# Patient Record
Sex: FEMALE | Race: WHITE | URBAN - METROPOLITAN AREA
[De-identification: names, ages, dates, MRNs, and addresses within clinical notes are randomized per-mention and may not be internally consistent; named-entity substitution may affect disease eponyms.]

---

## 2023-07-13 ENCOUNTER — TELEPHONE (OUTPATIENT)
Dept: CARDIOLOGY CLINIC | Age: 48
End: 2023-07-13

## 2023-07-13 NOTE — TELEPHONE ENCOUNTER
Patient was referred by Self, 2nd opinion  to the [address] [place] [address]  location with Dr. Lulu Chino. Patient has been scheduled for 09/26 at 8:45am (am/pm). Patient verbalized understanding of appointment instructions. Call complete.

## 2023-08-25 LAB
AVERAGE GLUCOSE: 117
CHOLESTEROL, TOTAL: 179 MG/DL
CHOLESTEROL/HDL RATIO: NORMAL
HBA1C MFR BLD: 5.7 %
HDLC SERPL-MCNC: 41 MG/DL (ref 35–70)
LDL CHOLESTEROL CALCULATED: 124 MG/DL (ref 0–160)
NONHDLC SERPL-MCNC: 138 MG/DL
TRIGL SERPL-MCNC: 72 MG/DL
VLDLC SERPL CALC-MCNC: NORMAL MG/DL

## 2023-09-20 ENCOUNTER — TELEPHONE (OUTPATIENT)
Dept: CARDIOLOGY CLINIC | Age: 48
End: 2023-09-20

## 2023-09-20 NOTE — TELEPHONE ENCOUNTER
Scheduled as a new patient for [facility] 9/26.  Needs to be re-scheduled with Gen cards

## 2023-09-20 NOTE — TELEPHONE ENCOUNTER
Called and spoke to the pt, pt was RS with [facility] 9/22 at the [facility] office due to the pt needing to return to work on 10/3.     Call Complete

## 2023-09-29 ENCOUNTER — OFFICE VISIT (OUTPATIENT)
Dept: INTERNAL MEDICINE CLINIC | Age: 48
End: 2023-09-29
Payer: COMMERCIAL

## 2023-09-29 VITALS
SYSTOLIC BLOOD PRESSURE: 118 MMHG | WEIGHT: 274 LBS | DIASTOLIC BLOOD PRESSURE: 72 MMHG | BODY MASS INDEX: 43 KG/M2 | TEMPERATURE: 97.1 F | HEART RATE: 100 BPM | HEIGHT: 67 IN | OXYGEN SATURATION: 99 %

## 2023-09-29 DIAGNOSIS — R07.9 CHEST PAIN, UNSPECIFIED TYPE: ICD-10-CM

## 2023-09-29 DIAGNOSIS — E78.00 PURE HYPERCHOLESTEROLEMIA: ICD-10-CM

## 2023-09-29 DIAGNOSIS — F32.A ANXIETY AND DEPRESSION: ICD-10-CM

## 2023-09-29 DIAGNOSIS — I10 PRIMARY HYPERTENSION: Primary | ICD-10-CM

## 2023-09-29 DIAGNOSIS — D75.839 THROMBOCYTOSIS: ICD-10-CM

## 2023-09-29 DIAGNOSIS — R73.03 PREDIABETES: ICD-10-CM

## 2023-09-29 DIAGNOSIS — F41.9 ANXIETY AND DEPRESSION: ICD-10-CM

## 2023-09-29 DIAGNOSIS — H61.23 BILATERAL IMPACTED CERUMEN: ICD-10-CM

## 2023-09-29 PROCEDURE — 3078F DIAST BP <80 MM HG: CPT | Performed by: STUDENT IN AN ORGANIZED HEALTH CARE EDUCATION/TRAINING PROGRAM

## 2023-09-29 PROCEDURE — 3074F SYST BP LT 130 MM HG: CPT | Performed by: STUDENT IN AN ORGANIZED HEALTH CARE EDUCATION/TRAINING PROGRAM

## 2023-09-29 PROCEDURE — 99203 OFFICE O/P NEW LOW 30 MIN: CPT | Performed by: STUDENT IN AN ORGANIZED HEALTH CARE EDUCATION/TRAINING PROGRAM

## 2023-09-29 RX ORDER — RIZATRIPTAN BENZOATE 10 MG/1
10 TABLET ORAL
COMMUNITY

## 2023-09-29 RX ORDER — DULOXETIN HYDROCHLORIDE 60 MG/1
60 CAPSULE, DELAYED RELEASE ORAL DAILY
COMMUNITY

## 2023-09-29 RX ORDER — ALBUTEROL SULFATE 90 UG/1
2 AEROSOL, METERED RESPIRATORY (INHALATION) EVERY 6 HOURS PRN
COMMUNITY

## 2023-09-29 RX ORDER — BUDESONIDE 1 MG/2ML
1 INHALANT ORAL 2 TIMES DAILY
COMMUNITY

## 2023-09-29 RX ORDER — PANTOPRAZOLE SODIUM 40 MG/1
40 TABLET, DELAYED RELEASE ORAL DAILY
COMMUNITY

## 2023-09-29 RX ORDER — SPIRONOLACTONE AND HYDROCHLOROTHIAZIDE 25; 25 MG/1; MG/1
1 TABLET ORAL DAILY
COMMUNITY

## 2023-09-29 RX ORDER — DESLORATADINE 5 MG/1
5 TABLET ORAL DAILY
COMMUNITY

## 2023-09-29 RX ORDER — LOSARTAN POTASSIUM 100 MG/1
100 TABLET ORAL DAILY
COMMUNITY

## 2023-09-29 RX ORDER — IPRATROPIUM BROMIDE AND ALBUTEROL SULFATE 2.5; .5 MG/3ML; MG/3ML
1 SOLUTION RESPIRATORY (INHALATION) EVERY 4 HOURS
COMMUNITY

## 2023-09-29 RX ORDER — LAMOTRIGINE 25 MG/1
25 TABLET ORAL 2 TIMES DAILY
COMMUNITY

## 2023-09-29 SDOH — ECONOMIC STABILITY: HOUSING INSECURITY
IN THE LAST 12 MONTHS, WAS THERE A TIME WHEN YOU DID NOT HAVE A STEADY PLACE TO SLEEP OR SLEPT IN A SHELTER (INCLUDING NOW)?: NO

## 2023-09-29 SDOH — ECONOMIC STABILITY: INCOME INSECURITY: HOW HARD IS IT FOR YOU TO PAY FOR THE VERY BASICS LIKE FOOD, HOUSING, MEDICAL CARE, AND HEATING?: NOT HARD AT ALL

## 2023-09-29 SDOH — ECONOMIC STABILITY: FOOD INSECURITY: WITHIN THE PAST 12 MONTHS, THE FOOD YOU BOUGHT JUST DIDN'T LAST AND YOU DIDN'T HAVE MONEY TO GET MORE.: NEVER TRUE

## 2023-09-29 SDOH — ECONOMIC STABILITY: FOOD INSECURITY: WITHIN THE PAST 12 MONTHS, YOU WORRIED THAT YOUR FOOD WOULD RUN OUT BEFORE YOU GOT MONEY TO BUY MORE.: NEVER TRUE

## 2023-09-29 ASSESSMENT — PATIENT HEALTH QUESTIONNAIRE - PHQ9
SUM OF ALL RESPONSES TO PHQ9 QUESTIONS 1 & 2: 1
SUM OF ALL RESPONSES TO PHQ QUESTIONS 1-9: 1
1. LITTLE INTEREST OR PLEASURE IN DOING THINGS: 1
SUM OF ALL RESPONSES TO PHQ QUESTIONS 1-9: 1
SUM OF ALL RESPONSES TO PHQ QUESTIONS 1-9: 1
2. FEELING DOWN, DEPRESSED OR HOPELESS: 0
SUM OF ALL RESPONSES TO PHQ QUESTIONS 1-9: 1

## 2023-09-29 NOTE — ASSESSMENT & PLAN NOTE
Chest pain seem to be noncardiac in nature. However she did have an extensive work-up before in the past in 2021 she evaluated by cardiology, had stress test and echo which were normal.  Recently she has new characteristic of her chest pain, she underwent CT angiogram that showed no evidence of ischemia, mild coronary calcification. -Multiple factors that could contribute to this chest pain, weight, acid reflux, anxiety  -Signs symptoms seem to be also related to costochondritis  -Will reevaluate her anxiety as above.   Change Protonix to taking it in the morning on empty stomach.    -We will have patient keep a diary of her symptoms, reevaluate in 4 weeks

## 2023-09-29 NOTE — PROGRESS NOTES
Karissa Ch (:  1975) is a 49 y.o. female here for evaluation of the following chief complaint(s):  New Patient         ASSESSMENT/PLAN:  1. Primary hypertension  Assessment & Plan:  BP at goal   Continue losartan, spironolactone-HCTZ  Orders:  -     Hemoglobin A1C; Future  -     Lipid, Fasting; Future  -     TSH with Reflex; Future  -     Comprehensive Metabolic Panel; Future  -     CBC with Auto Differential; Future  2. Anxiety and depression  Assessment & Plan: On lamotrigine?, duloxetine   Will need to reevaluate her symptoms after months, chest pain could be related to anxiety   3. Thrombocytosis  -     Hemoglobin A1C; Future  -     Lipid, Fasting; Future  -     TSH with Reflex; Future  -     Comprehensive Metabolic Panel; Future  -     CBC with Auto Differential; Future  4. Pure hypercholesterolemia  -     Hemoglobin A1C; Future  -     Lipid, Fasting; Future  -     TSH with Reflex; Future  -     Comprehensive Metabolic Panel; Future  -     CBC with Auto Differential; Future  5. Prediabetes  Assessment & Plan:   Discussed weight, low carb diet  6. Chest pain, unspecified type  Assessment & Plan:  Chest pain seem to be noncardiac in nature. However she did have an extensive work-up before in the past in  she evaluated by cardiology, had stress test and echo which were normal.  Recently she has new characteristic of her chest pain, she underwent CT angiogram that showed no evidence of ischemia, mild coronary calcification. -Multiple factors that could contribute to this chest pain, weight, acid reflux, anxiety  -Signs symptoms seem to be also related to costochondritis  -Will reevaluate her anxiety as above. Change Protonix to taking it in the morning on empty stomach.    -We will have patient keep a diary of her symptoms, reevaluate in 4 weeks     7.  Bilateral impacted cerumen  Assessment & Plan:  Irrigated with water in the office today      Return in about 4 weeks (around 10/27/2023) for

## 2023-09-29 NOTE — ASSESSMENT & PLAN NOTE
On lamotrigine?, duloxetine   Will need to reevaluate her symptoms after months, chest pain could be related to anxiety

## 2023-10-16 ENCOUNTER — TELEMEDICINE (OUTPATIENT)
Dept: INTERNAL MEDICINE CLINIC | Age: 48
End: 2023-10-16
Payer: COMMERCIAL

## 2023-10-16 DIAGNOSIS — J06.9 VIRAL UPPER RESPIRATORY TRACT INFECTION: Primary | ICD-10-CM

## 2023-10-16 PROCEDURE — 99212 OFFICE O/P EST SF 10 MIN: CPT | Performed by: STUDENT IN AN ORGANIZED HEALTH CARE EDUCATION/TRAINING PROGRAM

## 2023-10-16 ASSESSMENT — ENCOUNTER SYMPTOMS
CHOKING: 0
PHOTOPHOBIA: 0
APNEA: 0
VOICE CHANGE: 0
COUGH: 0
VOMITING: 0
WHEEZING: 0
CONSTIPATION: 0
TROUBLE SWALLOWING: 0
ABDOMINAL PAIN: 0
CHEST TIGHTNESS: 0
NAUSEA: 0
SHORTNESS OF BREATH: 0
DIARRHEA: 0
STRIDOR: 0
ABDOMINAL DISTENTION: 0

## 2023-10-16 NOTE — PROGRESS NOTES
Sultana Sepulveda, was evaluated through a synchronous (real-time) audio-video encounter. The patient (or guardian if applicable) is aware that this is a billable service, which includes applicable co-pays. This Virtual Visit was conducted with patient's (and/or legal guardian's) consent. Patient identification was verified, and a caregiver was present when appropriate. The patient was located at Home: [address] [place] [address]   [facility] [id number]  Provider was located at [facility] ([address] [place] [address]): [phone] [address] [place] [address] Juanita Urbano (:  1975) is a Established patient, presenting virtually for evaluation of the following:    Assessment & Plan   Below is the assessment and plan developed based on review of pertinent history, physical exam, labs, studies, and medications. 1. Viral upper respiratory tract infection  No indication for Abx at this time. Continue conservative treatments at home    Return if symptoms worsen or fail to improve. Subjective   Pharyngitis  Associated symptoms include congestion. Pertinent negatives include no abdominal pain, chest pain, chills, coughing, diaphoresis, fatigue, fever, nausea, rash, vomiting or weakness. Chest Congestion  Associated symptoms include congestion. Pertinent negatives include no abdominal pain, chest pain, chills, coughing, diaphoresis, fatigue, fever, nausea, rash, vomiting or weakness. Presnted today virtually. for congestion, body ache, feeling sick. Sx started last visit about 4-5 days ago. Noted some chest discomfort (similar to what was discussed at our last visit). Some throat discomfort, occasional dry cough. some low grade temp but no fever. Sister is also sick. Home covid test was negative. Chest discomfort now improved.    No fever, chill, n/v, productive cough,     Review of Systems   Constitutional:  Negative for activity change, appetite change, chills, diaphoresis, fatigue, fever and unexpected

## 2023-10-31 ENCOUNTER — OFFICE VISIT (OUTPATIENT)
Dept: INTERNAL MEDICINE CLINIC | Age: 48
End: 2023-10-31
Payer: COMMERCIAL

## 2023-10-31 VITALS
BODY MASS INDEX: 42.85 KG/M2 | DIASTOLIC BLOOD PRESSURE: 70 MMHG | HEART RATE: 112 BPM | OXYGEN SATURATION: 97 % | TEMPERATURE: 97.7 F | HEIGHT: 67 IN | SYSTOLIC BLOOD PRESSURE: 124 MMHG | WEIGHT: 273 LBS

## 2023-10-31 DIAGNOSIS — R00.2 PALPITATION: ICD-10-CM

## 2023-10-31 DIAGNOSIS — J06.9 UPPER RESPIRATORY TRACT INFECTION, UNSPECIFIED TYPE: Primary | ICD-10-CM

## 2023-10-31 DIAGNOSIS — M54.2 NECK PAIN: ICD-10-CM

## 2023-10-31 DIAGNOSIS — R07.9 CHEST PAIN, UNSPECIFIED TYPE: ICD-10-CM

## 2023-10-31 PROCEDURE — 3078F DIAST BP <80 MM HG: CPT | Performed by: STUDENT IN AN ORGANIZED HEALTH CARE EDUCATION/TRAINING PROGRAM

## 2023-10-31 PROCEDURE — 99213 OFFICE O/P EST LOW 20 MIN: CPT | Performed by: STUDENT IN AN ORGANIZED HEALTH CARE EDUCATION/TRAINING PROGRAM

## 2023-10-31 PROCEDURE — 3074F SYST BP LT 130 MM HG: CPT | Performed by: STUDENT IN AN ORGANIZED HEALTH CARE EDUCATION/TRAINING PROGRAM

## 2023-10-31 RX ORDER — IPRATROPIUM BROMIDE AND ALBUTEROL SULFATE 2.5; .5 MG/3ML; MG/3ML
1 SOLUTION RESPIRATORY (INHALATION) EVERY 4 HOURS
Qty: 360 ML | Refills: 3 | Status: SHIPPED | OUTPATIENT
Start: 2023-10-31

## 2023-10-31 RX ORDER — MONTELUKAST SODIUM 10 MG/1
10 TABLET ORAL NIGHTLY
COMMUNITY
End: 2023-10-31 | Stop reason: SDUPTHER

## 2023-10-31 RX ORDER — BUDESONIDE 1 MG/2ML
1 INHALANT ORAL 2 TIMES DAILY
Qty: 2 ML | Refills: 3 | Status: SHIPPED | OUTPATIENT
Start: 2023-10-31

## 2023-10-31 RX ORDER — LOSARTAN POTASSIUM 100 MG/1
100 TABLET ORAL DAILY
Qty: 90 TABLET | Refills: 1 | Status: SHIPPED | OUTPATIENT
Start: 2023-10-31

## 2023-10-31 RX ORDER — VILAZODONE HYDROCHLORIDE 20 MG/1
20 TABLET ORAL DAILY
COMMUNITY

## 2023-10-31 RX ORDER — DULOXETIN HYDROCHLORIDE 60 MG/1
60 CAPSULE, DELAYED RELEASE ORAL DAILY
Qty: 90 CAPSULE | Refills: 1 | Status: SHIPPED | OUTPATIENT
Start: 2023-10-31

## 2023-10-31 RX ORDER — DOXYCYCLINE HYCLATE 100 MG
100 TABLET ORAL 2 TIMES DAILY
Qty: 14 TABLET | Refills: 0 | Status: SHIPPED | OUTPATIENT
Start: 2023-10-31 | End: 2023-11-07

## 2023-10-31 RX ORDER — PANTOPRAZOLE SODIUM 40 MG/1
40 TABLET, DELAYED RELEASE ORAL DAILY
Qty: 90 TABLET | Refills: 1 | Status: SHIPPED | OUTPATIENT
Start: 2023-10-31

## 2023-10-31 RX ORDER — MONTELUKAST SODIUM 10 MG/1
10 TABLET ORAL NIGHTLY
Qty: 90 TABLET | Refills: 1 | Status: SHIPPED | OUTPATIENT
Start: 2023-10-31

## 2023-10-31 RX ORDER — SPIRONOLACTONE AND HYDROCHLOROTHIAZIDE 25; 25 MG/1; MG/1
1 TABLET ORAL DAILY
Qty: 90 TABLET | Refills: 1 | Status: SHIPPED | OUTPATIENT
Start: 2023-10-31

## 2023-10-31 NOTE — PROGRESS NOTES
Jose Doan (:  1975) is a 51 y.o. female here for evaluation of the following chief complaint(s):  Generalized Body Aches ( 4 wks  follow up Mid-line chest pain)         ASSESSMENT/PLAN:  1. Upper respiratory tract infection, unspecified type  Assessment & Plan:  Sx for several weeks, not improving. Will treat with Doxycycline (PCN allergies) for 7 days   2. Chest pain, unspecified type  Assessment & Plan:   Chest pain seem to be noncardiac in nature. However she did have an extensive work-up before in the past in  she evaluated by cardiology, had stress test and echo which were normal.  Recently she has new characteristic of her chest pain, she underwent CT angiogram that showed no evidence of ischemia, mild coronary calcification. -Multiple factors that could contribute to this chest pain, weight, acid reflux, anxiety  -Signs symptoms seem to be also related to costochondritis  -Change Protonix to taking it in the morning on empty stomach - some improvement   -Review diary of sx today. Obtain heart monitor. Orders:  -     Longterm Continuous Cardiac Event Monitor; Future  3. Palpitation  -     Longterm Continuous Cardiac Event Monitor; Future  4. Neck pain  -     [facility]      Return in about 3 months (around 2024) for Routine follow-up, chest pain. Subjective   SUBJECTIVE/OBJECTIVE:  HPI  Here for 4 weeks f/u. Still have chest discomfort, sharp stabbing chest pain at the mid chest area, sometimes assc with SOB. Also has neck pain, headache. Stll have congestion, sore throat, but no fever, chills, productive cough.      Review of Systems:   Constitutional:  Denies fever or chills   Eyes:  Denies change in visual acuity   HENT:  Denies nasal congestion or sore throat   Respiratory:  Denies cough or shortness of breath   Cardiovascular:  Denies chest pain or edema   GI:  Denies abdominal pain, nausea, vomiting, bloody

## 2023-11-17 ENCOUNTER — COMMUNITY OUTREACH (OUTPATIENT)
Dept: INTERNAL MEDICINE CLINIC | Age: 48
End: 2023-11-17

## 2023-11-27 ENCOUNTER — OFFICE VISIT (OUTPATIENT)
Dept: FAMILY MEDICINE CLINIC | Age: 48
End: 2023-11-27
Payer: COMMERCIAL

## 2023-11-27 ENCOUNTER — NURSE ONLY (OUTPATIENT)
Dept: CARDIOLOGY CLINIC | Age: 48
End: 2023-11-27

## 2023-11-27 ENCOUNTER — TELEPHONE (OUTPATIENT)
Dept: CARDIOLOGY CLINIC | Age: 48
End: 2023-11-27

## 2023-11-27 VITALS
OXYGEN SATURATION: 98 % | SYSTOLIC BLOOD PRESSURE: 118 MMHG | DIASTOLIC BLOOD PRESSURE: 80 MMHG | HEART RATE: 111 BPM | WEIGHT: 275 LBS | BODY MASS INDEX: 43.07 KG/M2 | TEMPERATURE: 97.4 F

## 2023-11-27 DIAGNOSIS — R07.9 CHEST PAIN, UNSPECIFIED TYPE: Primary | ICD-10-CM

## 2023-11-27 DIAGNOSIS — R07.9 CHEST PAIN, UNSPECIFIED TYPE: ICD-10-CM

## 2023-11-27 PROCEDURE — 93000 ELECTROCARDIOGRAM COMPLETE: CPT | Performed by: NURSE PRACTITIONER

## 2023-11-27 PROCEDURE — 3074F SYST BP LT 130 MM HG: CPT | Performed by: NURSE PRACTITIONER

## 2023-11-27 PROCEDURE — 3079F DIAST BP 80-89 MM HG: CPT | Performed by: NURSE PRACTITIONER

## 2023-11-27 PROCEDURE — 99215 OFFICE O/P EST HI 40 MIN: CPT | Performed by: NURSE PRACTITIONER

## 2023-11-27 RX ORDER — SUCRALFATE 1 G/1
1 TABLET ORAL 4 TIMES DAILY
Qty: 120 TABLET | Refills: 3 | Status: SHIPPED | OUTPATIENT
Start: 2023-11-27

## 2023-11-27 NOTE — TELEPHONE ENCOUNTER
Patient called stating that the monitor she had placed today caused her skin to break out in hives. Patient removed monitor and applied hydrocortisone cream and was advised to call back if rash worsened. She would like to know what to do now.        21 [phone]

## 2023-11-27 NOTE — PROGRESS NOTES
Shaggy Haynes (:  1975) is a 51 y.o. female,Established patient, here for evaluation of the following chief complaint(s):  Chest Pain (X 3 days )      ASSESSMENT/PLAN:  1. Chest pain, unspecified type  Assessment & Plan:  Ongoing pain c/w GERD, EKG negative today despite ongoing discomfort  No change with protonix at this point  Will check H Pylori breath test  Carafate slurry QID  Referral to GI for EGD  14 day holter to be placed by cardiology today  Orders:  -     EKG 12 Lead - Clinic Performed; Future  -     Longterm Continuous Cardiac Event Monitor; Future  -     H. Pylori Breath Test; Future  -     [facility] - Stephan Mi MD, Gastroenterology (EUS), [facility]      No follow-ups on file. SUBJECTIVE/OBJECTIVE:  Presents for ongoing chest pains since Saturday night. Sts they are sharp and midsternal and continuous. Some oain in left shoulder however the pain does not radiate. Nothing makes it better and its worse when she lays flat or on her right side. There is no increase with activity or improvement with position changes. She has been on Protonix for 1 month with no relief    This pin started in may of this year and she has been in the ED multiple times without diagnosis. Cardiac testing includes recent CT Cardiac with result of 0.6., labs have been negative for PE, troponins. ECHO gxt doen in  negative, mult CT PE negative. Her PCP planned a 2 week holter study which has not been completed.         Current Outpatient Medications   Medication Sig Dispense Refill    sucralfate (CARAFATE) 1 GM tablet Take 1 tablet by mouth 4 times daily 120 tablet 3    vilazodone HCl (VIIBRYD) 20 MG TABS Take 1 tablet by mouth daily      DULoxetine (CYMBALTA) 60 MG extended release capsule Take 1 capsule by mouth daily 90 capsule 1    spironolactone-hydroCHLOROthiazide (ALDACTAZIDE) 25-25 MG per tablet Take 1 tablet by mouth daily 90 tablet 1    losartan (COZAAR) 100 MG tablet Take 1 tablet by

## 2023-11-27 NOTE — ASSESSMENT & PLAN NOTE
Ongoing pain c/w GERD, EKG negative today despite ongoing discomfort- reassurance provided  No change with protonix at this point  Will check H Pylori breath test  Add in Carafate slurry QID  Referral to GI for EGD  Inst on low acid diet  Cont symptom log  14 day holter to be placed by cardiology today

## 2023-11-28 ENCOUNTER — NURSE ONLY (OUTPATIENT)
Dept: CARDIOLOGY CLINIC | Age: 48
End: 2023-11-28

## 2023-11-28 DIAGNOSIS — R07.9 CHEST PAIN, UNSPECIFIED TYPE: Primary | ICD-10-CM

## 2023-11-28 PROCEDURE — 93242 EXT ECG>48HR<7D RECORDING: CPT | Performed by: INTERNAL MEDICINE

## 2023-11-29 LAB — H PYLORI BREATH TEST: NEGATIVE

## 2023-12-01 ENCOUNTER — TELEPHONE (OUTPATIENT)
Dept: INTERNAL MEDICINE CLINIC | Age: 48
End: 2023-12-01

## 2023-12-01 NOTE — TELEPHONE ENCOUNTER
[phone] (home)   Spoke with patient she is having break out from the stickers used for heart monitor. She has had it on for (3) days.

## 2023-12-01 NOTE — TELEPHONE ENCOUNTER
Pt is having a break out with the new heart monitor and has taken benadryl and put cream on skin (raw and itchy). [facility] tole Pt to call PCP to see if its enough data so Pt could get the monitor removed.      Please call and advise

## 2023-12-01 NOTE — TELEPHONE ENCOUNTER
[phone] (home)   Spoke with patient this matter was taken care of. FYI:  PT. HAS ALREADY TAKEN BENADRYL P.O.    NOTE:  PER DR OWUSU ADVISED PATIENT  TO RETURN TO CARDIOLOGIST OFFICE TO HAVE MONITOR TAKEN OFF /WILL FOLLOW UP WHEN RESULTS ARE AVAILABLE.

## 2023-12-01 NOTE — TELEPHONE ENCOUNTER
Please advise pt she can take some antihistamine (benadryl etc). If no improvement, will need to take it out and return heart monitor to Cards office. Will follow up on the results.

## 2023-12-06 ENCOUNTER — OFFICE VISIT (OUTPATIENT)
Dept: INTERNAL MEDICINE CLINIC | Age: 48
End: 2023-12-06
Payer: COMMERCIAL

## 2023-12-06 VITALS
SYSTOLIC BLOOD PRESSURE: 128 MMHG | DIASTOLIC BLOOD PRESSURE: 70 MMHG | HEIGHT: 67 IN | BODY MASS INDEX: 43.47 KG/M2 | WEIGHT: 277 LBS

## 2023-12-06 DIAGNOSIS — B02.9 HERPES ZOSTER WITHOUT COMPLICATION: Primary | ICD-10-CM

## 2023-12-06 PROCEDURE — 99213 OFFICE O/P EST LOW 20 MIN: CPT | Performed by: STUDENT IN AN ORGANIZED HEALTH CARE EDUCATION/TRAINING PROGRAM

## 2023-12-06 PROCEDURE — 3074F SYST BP LT 130 MM HG: CPT | Performed by: STUDENT IN AN ORGANIZED HEALTH CARE EDUCATION/TRAINING PROGRAM

## 2023-12-06 PROCEDURE — 3078F DIAST BP <80 MM HG: CPT | Performed by: STUDENT IN AN ORGANIZED HEALTH CARE EDUCATION/TRAINING PROGRAM

## 2023-12-06 RX ORDER — VALACYCLOVIR HYDROCHLORIDE 1 G/1
1000 TABLET, FILM COATED ORAL 3 TIMES DAILY
Qty: 21 TABLET | Refills: 0 | Status: SHIPPED | OUTPATIENT
Start: 2023-12-06 | End: 2023-12-13

## 2023-12-06 RX ORDER — PREGABALIN 25 MG/1
25 CAPSULE ORAL 2 TIMES DAILY PRN
Qty: 42 CAPSULE | Refills: 0 | Status: SHIPPED | OUTPATIENT
Start: 2023-12-06 | End: 2023-12-20

## 2023-12-06 RX ORDER — PREGABALIN 25 MG/1
25 CAPSULE ORAL 3 TIMES DAILY
Qty: 42 CAPSULE | Refills: 0 | Status: SHIPPED | OUTPATIENT
Start: 2023-12-06 | End: 2023-12-06 | Stop reason: SDUPTHER

## 2023-12-06 NOTE — PROGRESS NOTES
Cassia Burt (:  1975) is a 51 y.o. female here for evaluation of the following chief complaint(s):  Rash (Itchy painful left side, ongoing since Saturday, red, )         ASSESSMENT/PLAN:  1. Herpes zoster without complication  Assessment & Plan: Will treat with Valtrex x 7 days   Pregabalin prn for neuropathic pain  Orders:  -     pregabalin (LYRICA) 25 MG capsule; Take 1 capsule by mouth 2 times daily as needed (shingles pain) for up to 14 days. Max Daily Amount: 50 mg, Disp-42 capsule, R-0Normal      No follow-ups on file. Subjective   SUBJECTIVE/OBJECTIVE:  YASEMIN Chakraborty presented today with painful skin rash on the left side of her torso. Sx started about 4 days ago. Associated with burning sensation that it hurts when the bra line rub against it. Reports increasing stress at home, stop working, has been taking care of her mother in law. Review of Systems:   Constitutional:  Denies fever or chills   Eyes:  Denies change in visual acuity   HENT:  Denies nasal congestion or sore throat   Respiratory:  Denies cough or shortness of breath   Cardiovascular:  Denies chest pain or edema   GI:  Denies abdominal pain, nausea, vomiting, bloody stools or diarrhea   :  Denies dysuria   Musculoskeletal:  Denies back pain or joint pain   Integument:  Denies rash   Neurologic:  Denies headache, focal weakness or sensory changes   Endocrine:  Denies polyuria or polydipsia   Lymphatic:  Denies swollen glands   Psychiatric:  Denies depression or anxiety        Current Outpatient Medications   Medication Sig Dispense Refill    valACYclovir (VALTREX) 1 g tablet Take 1 tablet by mouth 3 times daily for 7 days 21 tablet 0    pregabalin (LYRICA) 25 MG capsule Take 1 capsule by mouth 2 times daily as needed (shingles pain) for up to 14 days.  Max Daily Amount: 50 mg 42 capsule 0    sucralfate (CARAFATE) 1 GM tablet Take 1 tablet by mouth 4 times daily 120 tablet 3    vilazodone HCl (VIIBRYD) 20 MG TABS Take 1

## 2023-12-21 PROCEDURE — 93244 EXT ECG>48HR<7D REV&INTERPJ: CPT | Performed by: INTERNAL MEDICINE

## 2023-12-26 ENCOUNTER — TELEPHONE (OUTPATIENT)
Dept: CARDIOLOGY CLINIC | Age: 48
End: 2023-12-26

## 2023-12-26 DIAGNOSIS — I20.0 UNSTABLE ANGINA PECTORIS (HCC): Primary | ICD-10-CM

## 2023-12-26 DIAGNOSIS — R07.9 CHEST PAIN, UNSPECIFIED TYPE: ICD-10-CM

## 2023-12-26 NOTE — TELEPHONE ENCOUNTER
The final report for the cam monitor has been read and scanned under the media tab. Thank you for your referral. Please feel free to contact our office with any questions at  [phone].

## 2024-01-12 ENCOUNTER — OFFICE VISIT (OUTPATIENT)
Dept: INTERNAL MEDICINE CLINIC | Age: 49
End: 2024-01-12
Payer: COMMERCIAL

## 2024-01-12 ENCOUNTER — TELEPHONE (OUTPATIENT)
Dept: INTERNAL MEDICINE CLINIC | Age: 49
End: 2024-01-12

## 2024-01-12 VITALS
OXYGEN SATURATION: 98 % | HEART RATE: 76 BPM | TEMPERATURE: 97.4 F | SYSTOLIC BLOOD PRESSURE: 124 MMHG | DIASTOLIC BLOOD PRESSURE: 80 MMHG | BODY MASS INDEX: 43.23 KG/M2 | WEIGHT: 276 LBS

## 2024-01-12 DIAGNOSIS — R07.9 CHEST PAIN, UNSPECIFIED TYPE: Primary | ICD-10-CM

## 2024-01-12 PROCEDURE — 3079F DIAST BP 80-89 MM HG: CPT | Performed by: STUDENT IN AN ORGANIZED HEALTH CARE EDUCATION/TRAINING PROGRAM

## 2024-01-12 PROCEDURE — 99213 OFFICE O/P EST LOW 20 MIN: CPT | Performed by: STUDENT IN AN ORGANIZED HEALTH CARE EDUCATION/TRAINING PROGRAM

## 2024-01-12 PROCEDURE — 3074F SYST BP LT 130 MM HG: CPT | Performed by: STUDENT IN AN ORGANIZED HEALTH CARE EDUCATION/TRAINING PROGRAM

## 2024-01-12 PROCEDURE — 93000 ELECTROCARDIOGRAM COMPLETE: CPT | Performed by: STUDENT IN AN ORGANIZED HEALTH CARE EDUCATION/TRAINING PROGRAM

## 2024-01-12 RX ORDER — VILAZODONE HYDROCHLORIDE 40 MG/1
40 TABLET ORAL DAILY
COMMUNITY

## 2024-01-12 RX ORDER — PANTOPRAZOLE SODIUM 40 MG/1
40 TABLET, DELAYED RELEASE ORAL 2 TIMES DAILY
Qty: 90 TABLET | Refills: 1 | Status: SHIPPED | OUTPATIENT
Start: 2024-01-12

## 2024-01-12 RX ORDER — IBUPROFEN 800 MG/1
800 TABLET ORAL 2 TIMES DAILY PRN
Qty: 28 TABLET | Refills: 0 | Status: SHIPPED | OUTPATIENT
Start: 2024-01-12 | End: 2024-01-26

## 2024-01-12 ASSESSMENT — PATIENT HEALTH QUESTIONNAIRE - PHQ9
SUM OF ALL RESPONSES TO PHQ9 QUESTIONS 1 & 2: 1
SUM OF ALL RESPONSES TO PHQ QUESTIONS 1-9: 1
SUM OF ALL RESPONSES TO PHQ QUESTIONS 1-9: 1
9. THOUGHTS THAT YOU WOULD BE BETTER OFF DEAD, OR OF HURTING YOURSELF: 0
10. IF YOU CHECKED OFF ANY PROBLEMS, HOW DIFFICULT HAVE THESE PROBLEMS MADE IT FOR YOU TO DO YOUR WORK, TAKE CARE OF THINGS AT HOME, OR GET ALONG WITH OTHER PEOPLE: 0
2. FEELING DOWN, DEPRESSED OR HOPELESS: 0
SUM OF ALL RESPONSES TO PHQ QUESTIONS 1-9: 1
1. LITTLE INTEREST OR PLEASURE IN DOING THINGS: 1
8. MOVING OR SPEAKING SO SLOWLY THAT OTHER PEOPLE COULD HAVE NOTICED. OR THE OPPOSITE, BEING SO FIGETY OR RESTLESS THAT YOU HAVE BEEN MOVING AROUND A LOT MORE THAN USUAL: 0
3. TROUBLE FALLING OR STAYING ASLEEP: 0
7. TROUBLE CONCENTRATING ON THINGS, SUCH AS READING THE NEWSPAPER OR WATCHING TELEVISION: 0
SUM OF ALL RESPONSES TO PHQ QUESTIONS 1-9: 1
4. FEELING TIRED OR HAVING LITTLE ENERGY: 0
6. FEELING BAD ABOUT YOURSELF - OR THAT YOU ARE A FAILURE OR HAVE LET YOURSELF OR YOUR FAMILY DOWN: 0
5. POOR APPETITE OR OVEREATING: 0

## 2024-01-12 NOTE — PROGRESS NOTES
Kateryna Albert (:  1975) is a 48 y.o. female here for evaluation of the following chief complaint(s):  Chest Pain (Chest  soreness, burning x 3 days coughing ,sneezing,/)         ASSESSMENT/PLAN:  1. Chest pain, unspecified type  Assessment & Plan:   Chest pain seem to be noncardiac in nature. ECG no acute changes.   Also did have an extensive work-up before in the past in  she evaluated by cardiology, had stress test and echo which were normal.  Recently she has new characteristic of her chest pain, she underwent CT angiogram that showed no evidence of ischemia, mild coronary calcification.  -Multiple factors that could contribute to this chest pain, weight, acid reflux, anxiety  -Signs symptoms seem to be also related to costochondritis. There was a concern of underlying rheum due to recurrent costochondritis, negative cardaic w/o. Will check MOHAN  -Increase protonix to BID  - Pending GI evaluation   Orders:  -     EKG 12 Lead  -     MOHAN Reflex to Antibody Cascade; Future      Return if symptoms worsen or fail to improve.         Subjective   SUBJECTIVE/OBJECTIVE:  HPI  Kateryna presented today with chest discomfort.. She describes it as burning sensation, across the chest. Also some pain by the rib case on the right side.. Started about 3 days while watching TV .  Denies any modifying factors, nothing make it better or worse.   No N/V, fever, chills, orthopnea, PND, not worsen with exertion.     Review of Systems:   Constitutional:  Denies fever or chills   Eyes:  Denies change in visual acuity   HENT:  Denies nasal congestion or sore throat   Respiratory:  Denies cough or shortness of breath   Cardiovascular:  Denies chest pain or edema   GI:  Denies abdominal pain, nausea, vomiting, bloody stools or diarrhea   :  Denies dysuria   Musculoskeletal:  Denies back pain or joint pain   Integument:  Denies rash   Neurologic:  Denies headache, focal weakness or sensory changes   Endocrine:  Denies polyuria or

## 2024-01-12 NOTE — TELEPHONE ENCOUNTER
Pt is calling in to make an appt with Dr. Rawls and switch PCP Per [facility] send her a message due Halima Albert (Pt's Mother) being a [facility] Pt.     Pt would like a soon appt if possible     Please call and advise

## 2024-01-12 NOTE — ASSESSMENT & PLAN NOTE
Chest pain seem to be noncardiac in nature. ECG no acute changes.   Also did have an extensive work-up before in the past in 2021 she evaluated by cardiology, had stress test and echo which were normal.  Recently she has new characteristic of her chest pain, she underwent CT angiogram that showed no evidence of ischemia, mild coronary calcification.  -Multiple factors that could contribute to this chest pain, weight, acid reflux, anxiety  -Signs symptoms seem to be also related to costochondritis. There was a concern of underlying rheum due to recurrent costochondritis, negative cardaic w/o. Will check MOHAN  -Increase protonix to BID  - Pending GI evaluation

## 2024-01-23 ENCOUNTER — OFFICE VISIT (OUTPATIENT)
Dept: INTERNAL MEDICINE CLINIC | Age: 49
End: 2024-01-23
Payer: COMMERCIAL

## 2024-01-23 VITALS
TEMPERATURE: 97.2 F | HEART RATE: 104 BPM | DIASTOLIC BLOOD PRESSURE: 80 MMHG | OXYGEN SATURATION: 98 % | WEIGHT: 280 LBS | BODY MASS INDEX: 43.95 KG/M2 | HEIGHT: 67 IN | SYSTOLIC BLOOD PRESSURE: 122 MMHG

## 2024-01-23 DIAGNOSIS — M62.830 BACK SPASM: Primary | ICD-10-CM

## 2024-01-23 DIAGNOSIS — R51.9 NONINTRACTABLE HEADACHE, UNSPECIFIED CHRONICITY PATTERN, UNSPECIFIED HEADACHE TYPE: ICD-10-CM

## 2024-01-23 PROCEDURE — 99213 OFFICE O/P EST LOW 20 MIN: CPT | Performed by: STUDENT IN AN ORGANIZED HEALTH CARE EDUCATION/TRAINING PROGRAM

## 2024-01-23 PROCEDURE — 3079F DIAST BP 80-89 MM HG: CPT | Performed by: STUDENT IN AN ORGANIZED HEALTH CARE EDUCATION/TRAINING PROGRAM

## 2024-01-23 PROCEDURE — 3074F SYST BP LT 130 MM HG: CPT | Performed by: STUDENT IN AN ORGANIZED HEALTH CARE EDUCATION/TRAINING PROGRAM

## 2024-01-23 RX ORDER — CYCLOBENZAPRINE HCL 10 MG
10 TABLET ORAL EVERY 8 HOURS PRN
COMMUNITY
Start: 2024-01-21

## 2024-01-23 RX ORDER — HYDROCODONE BITARTRATE AND ACETAMINOPHEN 5; 325 MG/1; MG/1
1 TABLET ORAL EVERY 6 HOURS PRN
COMMUNITY
Start: 2024-01-21 | End: 2024-01-24

## 2024-01-23 RX ORDER — DICLOFENAC POTASSIUM 50 MG/1
50 TABLET, FILM COATED ORAL 2 TIMES DAILY
COMMUNITY
Start: 2024-01-21 | End: 2024-01-23 | Stop reason: ALTCHOICE

## 2024-01-23 RX ORDER — METHYLPREDNISOLONE 4 MG/1
TABLET ORAL
Qty: 1 KIT | Refills: 0 | Status: SHIPPED | OUTPATIENT
Start: 2024-01-23 | End: 2024-01-29

## 2024-01-23 NOTE — PROGRESS NOTES
Kateryna Albert (:  1975) is a 48 y.o. female here for evaluation of the following chief complaint(s):  Neck Pain, Back Pain, Headache, and Fatigue         ASSESSMENT/PLAN:  1. Back spasm  Assessment & Plan:  Can continue with Flexeril, heat as needed.   Stopped diclofenac as it not helping.   Avoid mixing NSAIDs.   Medrol dosepack  2. Nonintractable headache, unspecified chronicity pattern, unspecified headache type  Assessment & Plan:  HA going for several days, but she appears to be comfortable on today exam.  Reassurance provided to patient that this is not related to meningitis.  Can take ibuprofen as needed.  Monitor at home.  She also have a history of migraine, on triptan    No URI sx but does have body ache, HA, n, will check rapid flu to rule out flu.     Return if symptoms worsen or fail to improve.         Subjective   SUBJECTIVE/OBJECTIVE:  HPI  Kateryna presented today with multiple complains today -back spasm, neck pain, headache/nausea.  She reports that symptoms started about 6 days ago.  She endorses back spasm, initially was in her upper back, now seem to be worse and involve her whole back.  Is not aggravated by movement.   She also has \"neck stiffness \", headache and nausea.  She is concerns that it might be related to meningitis. Headaches seem to be constant, pt not sure if this tension-like, sometimes can be throbbing.  Involving all head.  Intermittent nausea associated with it, but overall it is tolerable. No vomiting.  No issue with neck movement. No fever. Started about same time with the back pain.   She presented to the ED at [facility] on 2024 for the above complaints, and was discharged home with diclofenac tablet, Flexeril and Norco.  However her symptom has not been improving..  Denies any fever, chills, sick contact, visual disturbances, urinary symptoms      Review of Systems:   Constitutional:  Denies fever or chills   Eyes:  Denies change in visual acuity   HENT:  Denies

## 2024-01-23 NOTE — ASSESSMENT & PLAN NOTE
HA going for several days, but she appears to be comfortable on today exam.  Reassurance provided to patient that this is not related to meningitis.  Can take ibuprofen as needed.  Monitor at home.  She also have a history of migraine, on triptan

## 2024-01-23 NOTE — ASSESSMENT & PLAN NOTE
Can continue with Flexeril, heat as needed.   Stopped diclofenac as it not helping.   Avoid mixing NSAIDs.   Medrol dosepack

## 2024-03-28 ENCOUNTER — OFFICE VISIT (OUTPATIENT)
Dept: INTERNAL MEDICINE CLINIC | Age: 49
End: 2024-03-28
Payer: COMMERCIAL

## 2024-03-28 VITALS
HEART RATE: 84 BPM | OXYGEN SATURATION: 97 % | BODY MASS INDEX: 43.98 KG/M2 | TEMPERATURE: 97.7 F | DIASTOLIC BLOOD PRESSURE: 80 MMHG | WEIGHT: 280.2 LBS | HEIGHT: 67 IN | SYSTOLIC BLOOD PRESSURE: 126 MMHG

## 2024-03-28 DIAGNOSIS — R76.8 POSITIVE ANA (ANTINUCLEAR ANTIBODY): ICD-10-CM

## 2024-03-28 DIAGNOSIS — R73.03 PREDIABETES: ICD-10-CM

## 2024-03-28 DIAGNOSIS — D75.839 THROMBOCYTOSIS: ICD-10-CM

## 2024-03-28 DIAGNOSIS — I10 PRIMARY HYPERTENSION: Primary | ICD-10-CM

## 2024-03-28 DIAGNOSIS — G47.33 OSA (OBSTRUCTIVE SLEEP APNEA): ICD-10-CM

## 2024-03-28 DIAGNOSIS — F41.9 ANXIETY AND DEPRESSION: ICD-10-CM

## 2024-03-28 DIAGNOSIS — F32.A ANXIETY AND DEPRESSION: ICD-10-CM

## 2024-03-28 DIAGNOSIS — E78.00 PURE HYPERCHOLESTEROLEMIA: ICD-10-CM

## 2024-03-28 DIAGNOSIS — J45.20 MILD INTERMITTENT ASTHMA WITHOUT COMPLICATION: ICD-10-CM

## 2024-03-28 DIAGNOSIS — R07.9 CHEST PAIN, UNSPECIFIED TYPE: ICD-10-CM

## 2024-03-28 PROBLEM — J06.9 UPPER RESPIRATORY TRACT INFECTION: Status: RESOLVED | Noted: 2023-10-31 | Resolved: 2024-03-28

## 2024-03-28 PROBLEM — H61.23 BILATERAL IMPACTED CERUMEN: Status: RESOLVED | Noted: 2023-09-29 | Resolved: 2024-03-28

## 2024-03-28 PROBLEM — B02.9 HERPES ZOSTER WITHOUT COMPLICATION: Status: RESOLVED | Noted: 2023-12-06 | Resolved: 2024-03-28

## 2024-03-28 PROBLEM — M62.830 BACK SPASM: Status: RESOLVED | Noted: 2024-01-23 | Resolved: 2024-03-28

## 2024-03-28 PROBLEM — J45.909 ASTHMA: Status: ACTIVE | Noted: 2024-03-28

## 2024-03-28 PROCEDURE — 3074F SYST BP LT 130 MM HG: CPT | Performed by: INTERNAL MEDICINE

## 2024-03-28 PROCEDURE — 3079F DIAST BP 80-89 MM HG: CPT | Performed by: INTERNAL MEDICINE

## 2024-03-28 PROCEDURE — 99214 OFFICE O/P EST MOD 30 MIN: CPT | Performed by: INTERNAL MEDICINE

## 2024-03-28 RX ORDER — DULOXETIN HYDROCHLORIDE 20 MG/1
CAPSULE, DELAYED RELEASE ORAL
Qty: 42 CAPSULE | Refills: 0 | Status: SHIPPED | OUTPATIENT
Start: 2024-03-28

## 2024-03-28 RX ORDER — VILAZODONE HYDROCHLORIDE 10 MG/1
10 TABLET ORAL DAILY
COMMUNITY

## 2024-03-28 RX ORDER — CLONAZEPAM 1 MG/1
1 TABLET ORAL NIGHTLY
COMMUNITY
Start: 2024-03-07

## 2024-03-28 ASSESSMENT — ENCOUNTER SYMPTOMS: SHORTNESS OF BREATH: 0

## 2024-03-28 NOTE — ASSESSMENT & PLAN NOTE
Likely diagnosed clinically, stable on current tx, denies sig side effects from treatment.  -continue pulmicort daily, albuterol as needed  -continue Singulair  -never had PFTs.

## 2024-03-28 NOTE — ASSESSMENT & PLAN NOTE
-not on meds  - 08/23  -Cardiac CT- no coronary artery stenosis. Total calcium score is 0.6 which corresponds to very mild coronary calcification and 75th percentile for age and sex.    today

## 2024-03-28 NOTE — PATIENT INSTRUCTIONS
-Sleep as long as necessary to feel rested (usually seven to eight hours for adults) and then get out of bed  -Maintain a regular sleep schedule, particularly a regular wake-up time in the morning  -Try not to force sleep  -Avoid caffeinated beverages after lunch  -Avoid alcohol near bedtime (eg, late afternoon and evening)  -Avoid smoking or other nicotine intake, particularly during the evening  -Adjust the bedroom environment as needed to decrease stimuli (eg, reduce ambient light, turn off the television or radio)  -Avoid prolonged use of light-emitting screens (laptops, tablets, smartphones, [place]) before bedtime   -Resolve concerns or worries before bedtime  -Exercise regularly for at least 20 minutes, preferably more than four to five hours prior to bedtime   -Avoid daytime naps, especially if they are longer than 20 to 30 minutes or occur late in the day  -Relaxation techniques like background sounds (white noise, fan, waves, rain and thunders), guided meditation for sleep, breathing exercises  -can try melatonin 1mg (studies show 1 mg works better than higher doses)      For cymbalta- Take 2 pills for 2 week then lower to 1 pill for 2 week then stop

## 2024-03-28 NOTE — ASSESSMENT & PLAN NOTE
-sees Psych, Morelia Loya, NP  -Currently on vilazodone 30 mg, lamotrigine 50mg, also on cymbalta for suspected fibromyalgia per prior PCP  -on nightly clonazepam for sleep (reports trazodone 100mg was not helpful) . Discussed sleep hygiene, CBT-I, can try melatonin 1 mg, recommend to try and lower clonazepam to 1/2 pill if possible especially given reported over sedation .  We have discussed the potential associated risks and side effects of long-term BNC use and I explained this would not be my first choice for insomnia treatment, recommend to discuss this with her psych provider.  -given on both vilazodone and Cymbalta which can increase anti plt and serotonin synd risk-> would like to wean off cymbalta-lower Cymbalta to 2 pills (40 mg) for 2 weeks, then lowered to 1 pill (20 mg) for 2 weeks then stop  -Follow-up with me in 6 weeks

## 2024-03-28 NOTE — PROGRESS NOTES
[facility]  Establish care visit   3/28/2024    Kateryna Albert (:  1975) kevin 48 y.o. female, here to establish care.    Chief Complaint   Patient presents with    Establish Care        Patient Active Problem List   Diagnosis    Primary hypertension    Anxiety and depression    Chest pain    Prediabetes    Pure hypercholesterolemia    Thrombocytosis    Nonintractable headache    Positive MOHAN (antinuclear antibody)    Asthma    TARA (obstructive sleep apnea)       ASSESSMENT/ PLAN:    Primary hypertension  Stable, within goal, tolerating tx well.   -continue losartan 100 mg, spironolactone with HCTZ 25/25 mg change to morning time to limit night time urination.   -reviewed last CMP , repeat now     Prediabetes  -continue to work on lifestyle modifications - low fat/ carb diet, regular physical activity, wt loss   -last A1c 5.7%  -she was wondering if GLP1 can be considered, we will readdress this after we see her repeat labs     Pure hypercholesterolemia  -not on meds  -   -Cardiac CT- no coronary artery stenosis. Total calcium score is 0.6 which corresponds to very mild coronary calcification and 75th percentile for age and sex.     Anxiety and depression  -sees Psych, Morelia Loya, NP  -Currently on vilazodone 30 mg, lamotrigine 50mg, also on cymbalta for suspected fibromyalgia per prior PCP  -on nightly clonazepam for sleep (reports trazodone 100mg was not helpful) . Discussed sleep hygiene, CBT-I, can try melatonin 1 mg, recommend to try and lower clonazepam to 1/2 pill if possible especially given reported over sedation .  We have discussed the potential associated risks and side effects of long-term BNC use and I explained this would not be my first choice for insomnia treatment, recommend to discuss this with her psych provider.  -given on both vilazodone and Cymbalta which can increase anti plt and serotonin synd risk-> would like to wean off cymbalta-lower Cymbalta

## 2024-03-28 NOTE — ASSESSMENT & PLAN NOTE
-continue to work on lifestyle modifications - low fat/ carb diet, regular physical activity, wt loss   -last A1c 5.7%  -she was wondering if GLP1 can be considered, we will readdress this after we see her repeat labs

## 2024-03-28 NOTE — ASSESSMENT & PLAN NOTE
-positive x 2 2019, 2021. Most recently tested 01/2024 and was negative   -Today reported PIP pain which we did not have time to fully evaluate, she will be coming back in 6 weeks and we can address this thoroughly

## 2024-03-28 NOTE — ASSESSMENT & PLAN NOTE
-noted on prior labs PLT ~550 persistently.   -hx of menorrhagia managed by gyn on northindrone (now with no bleeding).    -start with repeat CBC, if persistently elevated will send to hematology    n/a

## 2024-03-28 NOTE — ASSESSMENT & PLAN NOTE
Stable, within goal, tolerating tx well.   -continue losartan 100 mg, spironolactone with HCTZ 25/25 mg change to morning time to limit night time urination.   -reviewed last CMP 08/23, repeat now

## 2024-04-15 RX ORDER — PANTOPRAZOLE SODIUM 40 MG/1
TABLET, DELAYED RELEASE ORAL
Qty: 180 TABLET | Refills: 1 | Status: SHIPPED | OUTPATIENT
Start: 2024-04-15

## 2024-04-24 RX ORDER — DULOXETIN HYDROCHLORIDE 20 MG/1
CAPSULE, DELAYED RELEASE ORAL
Qty: 42 CAPSULE | Refills: 0 | OUTPATIENT
Start: 2024-04-24

## 2024-04-30 LAB — PAP SMEAR, EXTERNAL: NORMAL

## 2024-05-06 RX ORDER — LOSARTAN POTASSIUM 100 MG/1
100 TABLET ORAL DAILY
Qty: 90 TABLET | Refills: 1 | Status: SHIPPED | OUTPATIENT
Start: 2024-05-06

## 2024-05-14 ENCOUNTER — OFFICE VISIT (OUTPATIENT)
Age: 49
End: 2024-05-14
Payer: COMMERCIAL

## 2024-05-14 ENCOUNTER — OFFICE VISIT (OUTPATIENT)
Dept: INTERNAL MEDICINE CLINIC | Age: 49
End: 2024-05-14
Payer: COMMERCIAL

## 2024-05-14 VITALS
TEMPERATURE: 97.5 F | HEIGHT: 67 IN | BODY MASS INDEX: 44.57 KG/M2 | WEIGHT: 284 LBS | HEART RATE: 72 BPM | SYSTOLIC BLOOD PRESSURE: 124 MMHG | DIASTOLIC BLOOD PRESSURE: 74 MMHG | OXYGEN SATURATION: 96 %

## 2024-05-14 DIAGNOSIS — F41.8 DEPRESSION WITH ANXIETY: Primary | ICD-10-CM

## 2024-05-14 DIAGNOSIS — F41.9 ANXIETY AND DEPRESSION: ICD-10-CM

## 2024-05-14 DIAGNOSIS — M54.2 NECK PAIN: Primary | ICD-10-CM

## 2024-05-14 DIAGNOSIS — D75.839 THROMBOCYTOSIS: ICD-10-CM

## 2024-05-14 DIAGNOSIS — F32.A ANXIETY AND DEPRESSION: ICD-10-CM

## 2024-05-14 PROCEDURE — 3074F SYST BP LT 130 MM HG: CPT | Performed by: INTERNAL MEDICINE

## 2024-05-14 PROCEDURE — 90791 PSYCH DIAGNOSTIC EVALUATION: CPT | Performed by: PSYCHOLOGIST

## 2024-05-14 PROCEDURE — 99214 OFFICE O/P EST MOD 30 MIN: CPT | Performed by: INTERNAL MEDICINE

## 2024-05-14 PROCEDURE — 3078F DIAST BP <80 MM HG: CPT | Performed by: INTERNAL MEDICINE

## 2024-05-14 ASSESSMENT — PROMIS GLOBAL HEALTH SCALE
IN THE PAST 7 DAYS, HOW OFTEN HAVE YOU BEEN BOTHERED BY EMOTIONAL PROBLEMS, SUCH AS FEELING ANXIOUS, DEPRESSED, OR IRRITABLE [ON A SCALE FROM 1 (NEVER) TO 5 (ALWAYS)]?: ALWAYS
IN THE PAST 7 DAYS, HOW WOULD YOU RATE YOUR FATIGUE ON AVERAGE [ON A SCALE FROM 1 (NONE) TO 5 (VERY SEVERE)]?: SEVERE
IN GENERAL, HOW WOULD YOU RATE YOUR SATISFACTION WITH YOUR SOCIAL ACTIVITIES AND RELATIONSHIPS [ON A SCALE OF 1 (POOR) TO 5 (EXCELLENT)]?: POOR
IN GENERAL, PLEASE RATE HOW WELL YOU CARRY OUT YOUR USUAL SOCIAL ACTIVITIES (INCLUDES ACTIVITIES AT HOME, AT WORK, AND IN YOUR COMMUNITY, AND RESPONSIBILITIES AS A PARENT, CHILD, SPOUSE, EMPLOYEE, FRIEND, ETC) [ON A SCALE OF 1 (POOR) TO 5 (EXCELLENT)]?: VERY GOOD
IN GENERAL, HOW WOULD YOU RATE YOUR PHYSICAL HEALTH [ON A SCALE OF 1 (POOR) TO 5 (EXCELLENT)]?: FAIR
TO WHAT EXTENT ARE YOU ABLE TO CARRY OUT YOUR EVERYDAY PHYSICAL ACTIVITIES SUCH AS WALKING, CLIMBING STAIRS, CARRYING GROCERIES, OR MOVING A CHAIR [ON A SCALE OF 1 (NOT AT ALL) TO 5 (COMPLETELY)]?: COMPLETELY
IN GENERAL, WOULD YOU SAY YOUR HEALTH IS...[ON A SCALE OF 1 (POOR) TO 5 (EXCELLENT)]: FAIR
SUM OF RESPONSES TO QUESTIONS 2, 4, 5, & 10: 6
IN GENERAL, WOULD YOU SAY YOUR QUALITY OF LIFE IS...[ON A SCALE OF 1 (POOR) TO 5 (EXCELLENT)]: GOOD
IN THE PAST 7 DAYS, HOW WOULD YOU RATE YOUR PAIN ON AVERAGE [ON A SCALE FROM 0 (NO PAIN) TO 10 (WORST IMAGINABLE PAIN)]?: 3
IN GENERAL, HOW WOULD YOU RATE YOUR MENTAL HEALTH, INCLUDING YOUR MOOD AND YOUR ABILITY TO THINK [ON A SCALE OF 1 (POOR) TO 5 (EXCELLENT)]?: POOR
SUM OF RESPONSES TO QUESTIONS 3, 6, 7, & 8: 12

## 2024-05-14 NOTE — PROGRESS NOTES
Behavioral Health Consultation   Katarina Valles, PhD  Clinical Psychologist  5/14/2024      Time spent with Patient: 33 minutes  2:31-3:04  This is patient's 1st  [facility] appointment.    Reason for Consult:   Chief Complaint   Patient presents with    Depression    Anxiety    Stress    Sleep Problem       Referring Provider: Zuhair Rawls MD    Pt provided informed consent for the behavioral health program. Discussed with patient model of service to include the limits of confidentiality (i.e. abuse reporting, suicide intervention, etc.) and short-term intervention focused approach. Pt indicated understanding.    Subjective  LIFE CONTEXT   1. Family  With whom do you live? Spouse  Nathaniel, daughter 18  Lissette  - [facility] - with [facility], and dog dachshund   or partner? Yes Length of relationship? 24  Children?  Yes  Type of relationships with the people you live with? Fair with spouse, good with MIL and daughter  Supportive relationships? Nurse practitioner - [facility] since 9/23 monthly    2. Social  Friends? Yes  - 2 close friends Quality? Good Frequency of contact? daily  Other connection with community? no    3. 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 not applicable How are you doing? n/a  How do you support yourself financially? spouse    4. Recreation  For fun? Relaxation? Games in phone, social media, tv - cooking shows  How often? daily    5. Self-Care  Exercise on a regular basis for health? No  What type of exercise and how often?   none  Sleep ok? up frequently at night and difficulty getting to sleep   last 6 or 7 months - in bed at 11, can take 2-4 hours to fall asleep, Eat ok? Fair  Use tobacco (what and how often)?  denies  Use caffeine (what and how often)?   1-2 cans of cherry coke cans of soda per day  Use alcohol (what and how often)? none  Use drugs (what and how

## 2024-05-14 NOTE — ASSESSMENT & PLAN NOTE
-Woke up with neck tightness, no recent injury or trauma and no red flags, start with physical therapy   Daily Note     Today's date: 2020  Patient name: Giulia Diop  : 1956  MRN: [id number]  Referring provider: Iris Arriola MD  Dx:   Encounter Diagnosis     ICD-10-CM    1  Parkinson disease ([address] )  G20                   Subjective: Client with reports of L foot discomfort from last session  Objective: See treatment diary below    Fortino Moy     Warm up:  - High Knees  - Alternating UE/LE punch and kicks  - Sidestepping with big arm movements  - 30 cw/ccw shoulder circles    Shadow boxing 20 reps each (pt R hand dominant)  - 1 jab  - 2 cross  - 3 non dominant hook  - 4 dominant hook  - 5 nondominant upper cut  - 6 dominant upper cut     Circuit 1 (2 min each, 1 round, 1 min rest b/w rounds)  - Step ups onto BOSU with combos > step off  - Resisted fwd ambulation (yellow band) > combos  - Split foot jump taps on BOSU (30 sec) > Burnouts (30 sec)     *1 min rest b/w circuits*     Circuit 2 (2 min each, 2 rounds, 1 min rest b/w rounds)  - Running to cones in specific called out order (fwd/bwd) > combos  - Shuffle cone to cone tapping opposite hand to cone with upper cut on opposite hand    *2 min rest b/w circuits*    Core Circuit (1 min each, 2 rounds)  - Standing resisted isometric core rotations with perturbations (L)  - Standing resisted isometric core rotations with perturbations (R)  - 10# med ball pass lateral to/from this PT (L)  - 10# med ball pass lateral to/from this PT (R)     Cool down:  Floor to ceiling stretch x 5 reps  Calf Stretch  Toe Stretch      Assessment: Patient able to tolerate treatment session well today  Patient noted his L ankle pain has been better, however would like to avoid a lot of jumping today  He was fatigued by end of session with reduced overall amplitude that improved with PT verbal cues  He will continue to benefit from skilled outpatient PT in order to maximize his function with PD diagnosis and slow disease progression        Plan: Continue per plan of care        Precautions:   Past Medical History:   Diagnosis Date    Ankle injury     last assessed 7/15/2013    BPH (benign prostatic hyperplasia)     Bronchiolitis     Cancer ([facility] )     Prostate    Diabetes mellitus ([facility] )     Type II    History of benign prostatic hyperplasia     History of gastritis     History of insect bite     last assessed 10/4/2014    History of nocturia     History of stroke     Hypertension     Lumbar canal stenosis     Osteoarthritis, knee     Otitis externa     Pain in back     UPPER BACK, last assessed 2/25/2013    Retinal and vitreous disorder     Sinusitis, acute     last assessed 10/6/2016    Tendinitis of right rotator cuff     last assessed 8/21/2012           Outcome Measures 3/5/20 7/23/20       ABC Not assessed 80 6%       5x STS 11 5 sec 14 22 sec       TUG 7 32    11 03 carry    12 12   cog 8 42     8 70 carry    9 95 cog       10 meter walk test 1 25 m/s 1 30 m/s       6 minute walk test 2100 ft 1375 ft       DGI 23/24 23/24       MiniBest 25/28 25/28       FGA Not assessed 27/30
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
## 2024-05-14 NOTE — ASSESSMENT & PLAN NOTE
-noted on prior labs PLT ~550 persistently.   -hx of menorrhagia managed by gyn on northindrone (now with no bleeding).    -start with repeat CBC which we ordered last visit, will get before she comes back next month, if persistently elevated will send to hematology

## 2024-05-14 NOTE — PROGRESS NOTES
or 2009     Social History     Socioeconomic History    Marital status:      Spouse name: Not on file    Number of children: Not on file    Years of education: Not on file    Highest education level: Not on file   Occupational History    Not on file   Tobacco Use    Smoking status: Never     Passive exposure: Never    Smokeless tobacco: Never   Substance and Sexual Activity    Alcohol use: Not Currently    Drug use: Never    Sexual activity: Yes     Partners: Male   Other Topics Concern    Not on file   Social History Narrative    Not on file     Social Determinants of Health     Financial Resource Strain: Low Risk  (9/29/2023)    Overall Financial Resource Strain (CARDIA)     Difficulty of Paying Living Expenses: Not hard at all   Food Insecurity: Not on file (9/29/2023)   Transportation Needs: Unknown (9/29/2023)    PRAPARE - Transportation     Lack of Transportation (Medical): Not on file     Lack of Transportation (Non-Medical): No   Physical Activity: Not on file   Stress: Not on file   Social Connections: Not on file   Intimate Partner Violence: Not on file   Housing Stability: Unknown (9/29/2023)    Housing Stability Vital Sign     Unable to Pay for Housing in the Last Year: Not on file     Number of Places Lived in the Last Year: Not on file     Unstable Housing in the Last Year: No      Family History   Problem Relation Age of Onset    Alcohol Abuse Mother         Doesn’t think of herself as having a problem, but uses alcohol and sleeping pills to get to sleep every night    Allergy (Severe) Mother     Anemia Mother     Arthritis Mother     Cancer Mother         Lung cancer due to smoking for 60 plus years    Depression Mother     Hearing Loss Mother         Mostly age related    High Blood Pressure Mother     Mental Illness Mother     Vision Loss Mother     Arthritis Father         Suffered from arthritis in shoulder, elbow and hands    Atrial Fibrillation Father     Coronary Art Dis Father

## 2024-05-15 RX ORDER — SPIRONOLACTONE AND HYDROCHLOROTHIAZIDE 25; 25 MG/1; MG/1
1 TABLET ORAL DAILY
Qty: 90 TABLET | Refills: 1 | Status: SHIPPED | OUTPATIENT
Start: 2024-05-15

## 2024-05-29 ENCOUNTER — HOSPITAL ENCOUNTER (OUTPATIENT)
Dept: PHYSICAL THERAPY | Age: 49
Setting detail: THERAPIES SERIES
Discharge: HOME | End: 2024-05-29
Payer: COMMERCIAL

## 2024-05-29 DIAGNOSIS — M54.2 NECK PAIN: Primary | ICD-10-CM

## 2024-05-29 PROCEDURE — 97162 PT EVAL MOD COMPLEX 30 MIN: CPT

## 2024-05-29 PROCEDURE — 97110 THERAPEUTIC EXERCISES: CPT

## 2024-05-29 PROCEDURE — 97530 THERAPEUTIC ACTIVITIES: CPT

## 2024-05-29 NOTE — PLAN OF CARE
[facility] - Outpatient Rehabilitation and Therapy [address], [place] [address] office: [phone] fax: [phone]     Physical Therapy Initial Evaluation Certification      Dear Zuhair Rawls MD,    We had the pleasure of evaluating the following patient for physical therapy services at [facility].  A summary of our findings can be found in the initial assessment below.  This includes our plan of care.  If you have any questions or concerns regarding these findings, please do not hesitate to contact me at the office phone number listed above.  Thank you for the referral.     Physician Signature:_______________________________Date:__________________  By signing above (or electronic signature), therapist’s plan is approved by physician       Physical Therapy: TREATMENT/PROGRESS NOTE   Patient: Kateryna Albert (48 y.o. female)   Examination Date: 2024   :  1975 MRN: [id number]   Visit #: 1   Insurance Allowable Auth Needed   30 []Yes    [x]No    Insurance: Payor: BCBS / Plan: [employer] - [place] PPO / Product Type: *No Product type* /   Insurance ID: [id number] - ([facility])  Secondary Insurance (if applicable):    Treatment Diagnosis:     ICD-10-CM    1. Neck pain  M54.2          Medical Diagnosis:  Neck pain [M54.2]   Referring Physician: Zuhair Rawls MD  PCP: Zuhair Rawls MD     Plan of care signed (Y/N):     Date of Patient follow up with Physician:      Progress Report/POC: EVAL today  POC update due: (10 visits /OR AUTH LIMITS, whichever is less)  2024                                             Precautions/ Contra-indications:           Latex allergy:  YES  Pacemaker:    NO  Contraindications for Manipulation: None  Date of Surgery: N/A  Other:    Red Flags:  None    C-SSRS Triggered by Intake questionnaire:   Patient answered 'NO' to both behavioral questions on intake.  No further screening warranted    Preferred Language for

## 2024-06-03 ENCOUNTER — HOSPITAL ENCOUNTER (OUTPATIENT)
Dept: PHYSICAL THERAPY | Age: 49
Setting detail: THERAPIES SERIES
Discharge: HOME | End: 2024-06-03
Payer: COMMERCIAL

## 2024-06-03 PROCEDURE — 97110 THERAPEUTIC EXERCISES: CPT

## 2024-06-03 PROCEDURE — 97140 MANUAL THERAPY 1/> REGIONS: CPT

## 2024-06-03 NOTE — FLOWSHEET NOTE
pain, promoting relaxation,  increasing ROM, reducing/eliminating soft tissue swelling/inflammation/restriction, improving soft tissue extensibility and allowing for proper ROM for normal function with self care, mobility, lifting and ambulation    GOALS     Patient stated goal: \"to be relatively pain free\"  [] Progressing: [] Met: [] Not Met: [] Adjusted    Therapist goals for Patient:   Short Term Goals: To be achieved in: 2 weeks  1. Independent in HEP and progression per patient tolerance, in order to prevent re-injury.   [] Progressing: [] Met: [] Not Met: [] Adjusted  2. Patient will have a decrease in pain to <4/10 to facilitate improvement in movement, function, and ADLs as indicated by Functional Deficits.  [] Progressing: [] Met: [] Not Met: [] Adjusted    Long Term Goals: To be achieved in: 6-8 weeks  1. Disability index score of 25% or less for the Neck Disability Index to assist with reaching prior level of function with activities such as reaching overhead and driving.  [] Progressing: [] Met: [] Not Met: [] Adjusted  2. Patient will demonstrate increased AROM of cervical spine to WNL without pain to allow for proper joint functioning to enable patient to driving, reaching/dressing ADL's.   [] Progressing: [] Met: [] Not Met: [] Adjusted  3. Patient will demonstrate increased Strength of UE to at least 4+/5 throughout without pain to allow for proper functional mobility to enable patient to return to perform ADLs.   [] Progressing: [] Met: [] Not Met: [] Adjusted  4. Patient will return to driving for 20 minutes without increased symptoms or restriction.   [] Progressing: [] Met: [] Not Met: [] Adjusted  5. Patient will be able to sleep for 1 night without disturbances due to increased symptoms or restriction.     [] Progressing: [] Met: [] Not Met: [] Adjusted     Overall Progression Towards Functional goals/ Treatment Progress Update:  [] Patient is progressing as expected towards functional goals

## 2024-06-05 ENCOUNTER — HOSPITAL ENCOUNTER (OUTPATIENT)
Dept: PHYSICAL THERAPY | Age: 49
Setting detail: THERAPIES SERIES
Discharge: HOME | End: 2024-06-05
Payer: COMMERCIAL

## 2024-06-05 PROCEDURE — 97110 THERAPEUTIC EXERCISES: CPT

## 2024-06-05 PROCEDURE — 97140 MANUAL THERAPY 1/> REGIONS: CPT

## 2024-06-05 NOTE — FLOWSHEET NOTE
[facility] - Outpatient Rehabilitation and Therapy [address], [place] [address] office: [phone] fax: [phone]            Physical Therapy: TREATMENT/PROGRESS NOTE   Patient: Kateryna Albert (48 y.o. female)   Examination Date: 2024   :  1975 MRN: [id number]   Visit #: 3   Insurance Allowable Auth Needed   30 []Yes    [x]No    Insurance: Payor: BCBS / Plan: [employer] - [place] PPO / Product Type: *No Product type* /   Insurance ID: [id number] - ([facility])  Secondary Insurance (if applicable):    Treatment Diagnosis:     ICD-10-CM    1. Neck pain  M54.2          Medical Diagnosis:  Neck pain [M54.2]   Referring Physician: Zuhair Rawls MD  PCP: Zuhair Rawls MD     Plan of care signed (Y/N):     Date of Patient follow up with Physician:      Progress Report/POC: NO  POC update due: (10 visits /OR AUTH LIMITS, whichever is less)  2024                                             Precautions/ Contra-indications:           Latex allergy:  YES  Pacemaker:    NO  Contraindications for Manipulation: None  Date of Surgery: N/A  Other:    Red Flags:  None    C-SSRS Triggered by Intake questionnaire:   Patient answered 'NO' to both behavioral questions on intake.  No further screening warranted    Preferred Language for Healthcare:   [x] English       [] other:    SUBJECTIVE EXAMINATION     Patient stated complaint: Pt reports that her neck is feeling better today and hasn't had any sxs into R hand after last session. Pt reports that she tried to crack her neck on her own yesterday and had some dizziness that lasted for the day. Pt reports that has resolved today though.       Test used Initial score  2024   Pain Summary VAS 5/10 3/10 neck, 0/10 R hand   Functional questionnaire Neck Disability Index 40% disability    Other:                OBJECTIVE EXAMINATION     24    CERV ROM      Cervical Flexion 15 *neck pn    Cervical Extension 25 *neck pn

## 2024-06-11 ENCOUNTER — HOSPITAL ENCOUNTER (OUTPATIENT)
Dept: PHYSICAL THERAPY | Age: 49
Setting detail: THERAPIES SERIES
Discharge: HOME | End: 2024-06-11
Payer: COMMERCIAL

## 2024-06-11 ENCOUNTER — APPOINTMENT (OUTPATIENT)
Dept: PHYSICAL THERAPY | Age: 49
End: 2024-06-11
Payer: COMMERCIAL

## 2024-06-11 PROCEDURE — 97140 MANUAL THERAPY 1/> REGIONS: CPT

## 2024-06-11 PROCEDURE — 97110 THERAPEUTIC EXERCISES: CPT

## 2024-06-11 NOTE — FLOWSHEET NOTE
tissue swelling/inflammation/restriction, improving soft tissue extensibility and allowing for proper ROM for normal function with self care, mobility, lifting and ambulation    GOALS     Patient stated goal: \"to be relatively pain free\"  [] Progressing: [] Met: [] Not Met: [] Adjusted    Therapist goals for Patient:   Short Term Goals: To be achieved in: 2 weeks  1. Independent in HEP and progression per patient tolerance, in order to prevent re-injury.   [] Progressing: [] Met: [] Not Met: [] Adjusted  2. Patient will have a decrease in pain to <4/10 to facilitate improvement in movement, function, and ADLs as indicated by Functional Deficits.  [] Progressing: [] Met: [] Not Met: [] Adjusted    Long Term Goals: To be achieved in: 6-8 weeks  1. Disability index score of 25% or less for the Neck Disability Index to assist with reaching prior level of function with activities such as reaching overhead and driving.  [] Progressing: [] Met: [] Not Met: [] Adjusted  2. Patient will demonstrate increased AROM of cervical spine to WNL without pain to allow for proper joint functioning to enable patient to driving, reaching/dressing ADL's.   [] Progressing: [] Met: [] Not Met: [] Adjusted  3. Patient will demonstrate increased Strength of UE to at least 4+/5 throughout without pain to allow for proper functional mobility to enable patient to return to perform ADLs.   [] Progressing: [] Met: [] Not Met: [] Adjusted  4. Patient will return to driving for 20 minutes without increased symptoms or restriction.   [] Progressing: [] Met: [] Not Met: [] Adjusted  5. Patient will be able to sleep for 1 night without disturbances due to increased symptoms or restriction.     [] Progressing: [] Met: [] Not Met: [] Adjusted     Overall Progression Towards Functional goals/ Treatment Progress Update:  [] Patient is progressing as expected towards functional goals listed.    [] Progression is slowed due to complexities/Impairments

## 2024-06-13 ENCOUNTER — HOSPITAL ENCOUNTER (OUTPATIENT)
Dept: PHYSICAL THERAPY | Age: 49
Setting detail: THERAPIES SERIES
Discharge: HOME | End: 2024-06-13
Payer: COMMERCIAL

## 2024-06-13 DIAGNOSIS — I10 PRIMARY HYPERTENSION: ICD-10-CM

## 2024-06-13 DIAGNOSIS — D75.839 THROMBOCYTOSIS: ICD-10-CM

## 2024-06-13 DIAGNOSIS — R73.03 PREDIABETES: ICD-10-CM

## 2024-06-13 LAB
ALBUMIN SERPL-MCNC: 4.2 G/DL (ref 3.4–5)
ALBUMIN/GLOB SERPL: 1.8 {RATIO} (ref 1.1–2.2)
ALP SERPL-CCNC: 86 U/L (ref 40–129)
ALT SERPL-CCNC: 23 U/L (ref 10–40)
ANION GAP SERPL CALCULATED.3IONS-SCNC: 12 MMOL/L (ref 3–16)
AST SERPL-CCNC: 17 U/L (ref 15–37)
BASOPHILS # BLD: 0 K/UL (ref 0–0.2)
BASOPHILS NFR BLD: 0.3 %
BILIRUB SERPL-MCNC: 0.3 MG/DL (ref 0–1)
BUN SERPL-MCNC: 12 MG/DL (ref 7–20)
CALCIUM SERPL-MCNC: 9.2 MG/DL (ref 8.3–10.6)
CHLORIDE SERPL-SCNC: 98 MMOL/L (ref 99–110)
CO2 SERPL-SCNC: 24 MMOL/L (ref 21–32)
CREAT SERPL-MCNC: 0.7 MG/DL (ref 0.6–1.1)
DEPRECATED RDW RBC AUTO: 15.6 % (ref 12.4–15.4)
EOSINOPHIL # BLD: 0.1 K/UL (ref 0–0.6)
EOSINOPHIL NFR BLD: 1.2 %
GFR SERPLBLD CREATININE-BSD FMLA CKD-EPI: >90 ML/MIN/{1.73_M2}
GLUCOSE SERPL-MCNC: 101 MG/DL (ref 70–99)
HCT VFR BLD AUTO: 36.8 % (ref 36–48)
HGB BLD-MCNC: 12.3 G/DL (ref 12–16)
LYMPHOCYTES # BLD: 2.3 K/UL (ref 1–5.1)
LYMPHOCYTES NFR BLD: 24.5 %
MCH RBC QN AUTO: 28.3 PG (ref 26–34)
MCHC RBC AUTO-ENTMCNC: 33.5 G/DL (ref 31–36)
MCV RBC AUTO: 84.3 FL (ref 80–100)
MONOCYTES # BLD: 0.5 K/UL (ref 0–1.3)
MONOCYTES NFR BLD: 5.1 %
NEUTROPHILS # BLD: 6.4 K/UL (ref 1.7–7.7)
NEUTROPHILS NFR BLD: 68.9 %
PLATELET # BLD AUTO: 470 K/UL (ref 135–450)
PMV BLD AUTO: 7.1 FL (ref 5–10.5)
POTASSIUM SERPL-SCNC: 3.8 MMOL/L (ref 3.5–5.1)
PROT SERPL-MCNC: 6.6 G/DL (ref 6.4–8.2)
RBC # BLD AUTO: 4.37 M/UL (ref 4–5.2)
SODIUM SERPL-SCNC: 134 MMOL/L (ref 136–145)
WBC # BLD AUTO: 9.2 K/UL (ref 4–11)

## 2024-06-13 PROCEDURE — 97140 MANUAL THERAPY 1/> REGIONS: CPT

## 2024-06-13 PROCEDURE — 97110 THERAPEUTIC EXERCISES: CPT

## 2024-06-13 NOTE — FLOWSHEET NOTE
[facility] - Outpatient Rehabilitation and Therapy [address], [place] [address] office: [phone] fax: [phone]        Physical Therapy: TREATMENT/PROGRESS NOTE   Patient: Kateryna Albert (48 y.o. female)   Examination Date: 2024   :  1975 MRN: [id number]   Visit #: 5   Insurance Allowable Auth Needed   30 []Yes    [x]No    Insurance: Payor: BCBS / Plan: [employer] - [place] PPO / Product Type: *No Product type* /   Insurance ID: [id number] - ([facility])  Secondary Insurance (if applicable):    Treatment Diagnosis:     ICD-10-CM    1. Neck pain  M54.2          Medical Diagnosis:  Neck pain [M54.2]   Referring Physician: Zuhair Rawls MD  PCP: Zuhair Rawls MD     Plan of care signed (Y/N):     Date of Patient follow up with Physician:      Progress Report/POC: NO  POC update due: (10 visits /OR AUTH LIMITS, whichever is less)  2024                                             Precautions/ Contra-indications:           Latex allergy:  YES  Pacemaker:    NO  Contraindications for Manipulation: None  Date of Surgery: N/A  Other:    Red Flags:  None    C-SSRS Triggered by Intake questionnaire:   Patient answered 'NO' to both behavioral questions on intake.  No further screening warranted    Preferred Language for Healthcare:   [x] English       [] other:    SUBJECTIVE EXAMINATION     Patient stated complaint: Pt reports that she cleaned the bathroom yesterday using R arm and her neck and shoulder blade feel tight today. No burning or soreness in hand. Pt reports she had another instance of dizziness that happened yesterday while she was standing and reaching into [place] cabinet.      Test used Initial score  2024   Pain Summary VAS 5/10 3/10 neck, 0/10 R hand   Functional questionnaire Neck Disability Index 40% disability    Other:                OBJECTIVE EXAMINATION     24:  /88  (-) extension rotation test  (+) cervical torsion test - (+) for

## 2024-06-14 LAB
EST. AVERAGE GLUCOSE BLD GHB EST-MCNC: 116.9 MG/DL
HBA1C MFR BLD: 5.7 %

## 2024-06-17 ENCOUNTER — HOSPITAL ENCOUNTER (OUTPATIENT)
Dept: PHYSICAL THERAPY | Age: 49
Setting detail: THERAPIES SERIES
Discharge: HOME | End: 2024-06-17
Payer: COMMERCIAL

## 2024-06-17 PROCEDURE — 97110 THERAPEUTIC EXERCISES: CPT

## 2024-06-17 PROCEDURE — 97140 MANUAL THERAPY 1/> REGIONS: CPT

## 2024-06-17 NOTE — FLOWSHEET NOTE
Progressing: [] Met: [] Not Met: [] Adjusted  5. Patient will be able to sleep for 1 night without disturbances due to increased symptoms or restriction.     [] Progressing: [] Met: [] Not Met: [] Adjusted     Overall Progression Towards Functional goals/ Treatment Progress Update:  [] Patient is progressing as expected towards functional goals listed.    [] Progression is slowed due to complexities/Impairments listed.  [] Progression has been slowed due to co-morbidities.  [x] Plan just implemented, too soon (<30days) to assess goals progression   [] Goals require adjustment due to lack of progress  [] Patient is not progressing as expected and requires additional follow up with physician  [] Other:     TREATMENT PLAN     Frequency/Duration: 2x/week for  6-8  weeks for the following treatment interventions:    Interventions:  Therapeutic Exercise (26844) including: strength training, ROM, and functional mobility  Therapeutic Activities (85224) including: functional mobility training and education.  Neuromuscular Re-education (63926) activation and proprioception, including postural re-education.    Manual Therapy (84595) as indicated to include: Passive Range of Motion, Gr I-IV mobilizations, Soft Tissue Mobilization, and Dry Needling/IASTM    Plan: Cont POC- Continue emphasis/focus on exercise progression, improving proper muscle recruitment and activation/motor control patterns, modulating pain, and improving postural awareness. Next visit plan to add new exercises     Electronically Signed by Ana Gordon PT  Date: 06/17/2024     Note: Portions of this note have been templated and/or copied from initial evaluation, reassessments and prior notes for documentation efficiency.    Note: If patient does not return for scheduled/recommended follow up visits, this note will serve as a discharge from care along with the most recent update on progress.    Ortho Evaluation

## 2024-06-19 ENCOUNTER — APPOINTMENT (OUTPATIENT)
Dept: PHYSICAL THERAPY | Age: 49
End: 2024-06-19
Payer: COMMERCIAL

## 2024-06-19 ENCOUNTER — HOSPITAL ENCOUNTER (OUTPATIENT)
Dept: GENERAL RADIOLOGY | Age: 49
Discharge: HOME | End: 2024-06-19
Payer: COMMERCIAL

## 2024-06-19 ENCOUNTER — OFFICE VISIT (OUTPATIENT)
Dept: INTERNAL MEDICINE CLINIC | Age: 49
End: 2024-06-19
Payer: COMMERCIAL

## 2024-06-19 VITALS
HEIGHT: 67 IN | SYSTOLIC BLOOD PRESSURE: 120 MMHG | BODY MASS INDEX: 44.57 KG/M2 | OXYGEN SATURATION: 97 % | WEIGHT: 284 LBS | HEART RATE: 91 BPM | TEMPERATURE: 97.4 F | DIASTOLIC BLOOD PRESSURE: 80 MMHG

## 2024-06-19 DIAGNOSIS — Z11.59 ENCOUNTER FOR HCV SCREENING TEST FOR LOW RISK PATIENT: ICD-10-CM

## 2024-06-19 DIAGNOSIS — Z00.00 ANNUAL PHYSICAL EXAM: Primary | ICD-10-CM

## 2024-06-19 DIAGNOSIS — R76.8 POSITIVE ANA (ANTINUCLEAR ANTIBODY): ICD-10-CM

## 2024-06-19 DIAGNOSIS — R33.9 URINARY RETENTION: ICD-10-CM

## 2024-06-19 DIAGNOSIS — F32.A ANXIETY AND DEPRESSION: ICD-10-CM

## 2024-06-19 DIAGNOSIS — G43.909 MIGRAINE WITHOUT STATUS MIGRAINOSUS, NOT INTRACTABLE, UNSPECIFIED MIGRAINE TYPE: ICD-10-CM

## 2024-06-19 DIAGNOSIS — E78.00 PURE HYPERCHOLESTEROLEMIA: ICD-10-CM

## 2024-06-19 DIAGNOSIS — J45.20 MILD INTERMITTENT ASTHMA WITHOUT COMPLICATION: ICD-10-CM

## 2024-06-19 DIAGNOSIS — E87.1 HYPONATREMIA: ICD-10-CM

## 2024-06-19 DIAGNOSIS — F41.9 ANXIETY AND DEPRESSION: ICD-10-CM

## 2024-06-19 DIAGNOSIS — D75.839 THROMBOCYTOSIS: ICD-10-CM

## 2024-06-19 PROCEDURE — 99396 PREV VISIT EST AGE 40-64: CPT | Performed by: INTERNAL MEDICINE

## 2024-06-19 PROCEDURE — 3079F DIAST BP 80-89 MM HG: CPT | Performed by: INTERNAL MEDICINE

## 2024-06-19 PROCEDURE — 3074F SYST BP LT 130 MM HG: CPT | Performed by: INTERNAL MEDICINE

## 2024-06-19 PROCEDURE — 73130 X-RAY EXAM OF HAND: CPT

## 2024-06-19 RX ORDER — TRAZODONE HYDROCHLORIDE 100 MG/1
100 TABLET ORAL NIGHTLY PRN
COMMUNITY
Start: 2024-06-16

## 2024-06-19 ASSESSMENT — ENCOUNTER SYMPTOMS
DIARRHEA: 0
SHORTNESS OF BREATH: 0
COLOR CHANGE: 0
NAUSEA: 0
EYE REDNESS: 0
ABDOMINAL PAIN: 0
COUGH: 0
BLOOD IN STOOL: 0

## 2024-06-19 NOTE — ASSESSMENT & PLAN NOTE
-noted on prior labs PLT ~550 persistently, now 480 on recent labs 06/24.   -hx of menorrhagia managed by gyn on [facility] (now with no bleeding).    -given persistently elevated will send to hematology for eval

## 2024-06-19 NOTE — ASSESSMENT & PLAN NOTE
Likely diagnosed clinically. Does report increased allergies lately leading to increase albuterol use to 1-2 / week (from 1/ month prior)  -never had PFTs, will get now with methacholine challenge if needed  -continue pulmicort daily, albuterol as needed  -continue Singulair

## 2024-06-19 NOTE — ASSESSMENT & PLAN NOTE
Here for annual physical exam, overall doing well from a clinical standpoint.  As for health maintenance:  -cervical cancer screen: Negative Pap smear 4/24  -breast cancer screen: Negative mammogram 3/24  -colon cancer screen: Normal colonoscopy 2015, due next 2025  -BP within goal  -BMI 44, recent A1c reviewed, lipids ordered  -HCV ordered  -Known depression managed by psych  -Advised to eat a healthy, well-balanced diet  -Advised to exercise at least 30min/day 5x/week for heart and bone health  -Check skin regularly for new moles or changes in moles. wear sunscreen at least SPF 30 and don't forget to reapply every 3-4 hours or if you are in the water  -Follow up with dentist at least twice/year.  -Follow up with eye doctor at least once/year.  -Calcium goal is 1200 mg a day ideally from diet (dairy, green leafy vegetables, nuts) .Recent A1c

## 2024-06-19 NOTE — ASSESSMENT & PLAN NOTE
-positive x 2 2019, 2021. Most recently tested 01/2024 and was negative   -Today reported PIP pain bilateral with occasional swelling, but overall description suggestive more of OA than inflammatory arthritis.  Will get hand x-ray to evaluate for erosive changes/OA change
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
## 2024-06-19 NOTE — PROGRESS NOTES
person, place, and time.   Psychiatric:         Behavior: Behavior normal.         The 10-year ASCVD risk score (Faye DELGADO, et al., 2019) is: 1.7%    Values used to calculate the score:      Age: 48 years      Sex: Female      Is Non- : No      Diabetic: No      Tobacco smoker: No      Systolic Blood Pressure: 120 mmHg      Is BP treated: Yes      HDL Cholesterol: 41 mg/dL      Total Cholesterol: 179 mg/dL    Immunization History   Administered Date(s) Administered    COVID-19, MODERNA BLUE border, Primary or Immunocompromised, (age 12y+), IM, 100 mcg/0.5mL 04/08/2021, 05/06/2021, 03/05/2022    COVID-19, PFIZER Bivalent, DO NOT Dilute, (age 12y+), IM, 30 mcg/0.3 mL 09/05/2023    Influenza A ([id number]),all Formulations 11/11/2009    Influenza Virus Vaccine 11/15/2014, 10/30/2017, 10/11/2018, 09/09/2020    Influenza Whole 11/04/2011, 10/17/2012, 12/31/2013    Influenza, AFLURIA (age 3 yrs+), FLUZONE, (age 6 mo+), MDV, 0.5mL 10/29/2015    Influenza, FLUARIX, FLULAVAL, FLUZONE (age 6 mo+) AND AFLURIA, (age 3 y+), PF, 0.5mL 10/14/2021    Influenza, FLUBLOK, (age 18 y+), PF, 0.5mL 09/21/2019, 09/05/2023    Pneumococcal, PCV20, PREVNAR 20, (age 6w+), IM, 0.5mL 08/25/2023       Health Maintenance   Topic Date Due    Hepatitis B vaccine (1 of 3 - 3-dose series) Never done    HIV screen  Never done    Hepatitis C screen  Never done    DTaP/Tdap/Td vaccine (1 - Tdap) Never done    COVID-19 Vaccine (5 - 2023-24 season) 10/31/2023    Depression Monitoring  01/12/2025    A1C test (Diabetic or Prediabetic)  06/13/2025    Colorectal Cancer Screen  06/17/2025    Breast cancer screen  03/28/2026    Cervical cancer screen  04/30/2027    Lipids  08/25/2028    Flu vaccine  Completed    Pneumococcal 0-64 years Vaccine  Completed    Hepatitis A vaccine  Aged Out    Hib vaccine  Aged Out    Polio vaccine  Aged Out    Meningococcal (ACWY) vaccine  Aged Out    Depression Screen  Discontinued       Zuhair Rawls MD   14-Jan-2019 04:47

## 2024-06-20 ENCOUNTER — APPOINTMENT (OUTPATIENT)
Dept: PHYSICAL THERAPY | Age: 49
End: 2024-06-20
Payer: COMMERCIAL

## 2024-06-24 ENCOUNTER — PATIENT MESSAGE (OUTPATIENT)
Dept: INTERNAL MEDICINE CLINIC | Age: 49
End: 2024-06-24

## 2024-06-24 ENCOUNTER — HOSPITAL ENCOUNTER (OUTPATIENT)
Dept: PHYSICAL THERAPY | Age: 49
Setting detail: THERAPIES SERIES
Discharge: HOME | End: 2024-06-24
Payer: COMMERCIAL

## 2024-06-24 DIAGNOSIS — R42 DIZZINESS: Primary | ICD-10-CM

## 2024-06-24 PROCEDURE — 97140 MANUAL THERAPY 1/> REGIONS: CPT

## 2024-06-24 PROCEDURE — 97110 THERAPEUTIC EXERCISES: CPT

## 2024-06-24 NOTE — FLOWSHEET NOTE
Needle 3+ muscle (84838)     Iontophoresis (18582)    VASO (42717)     Ultrasound (81279)    Group Therapy (37673)     Estim Attended (72658)    Canalith Repositioning (97647)     Other:    Other:    Total Timed Code Tx Minutes 40 3       Total Treatment Minutes 40        Charge Justification:  (23249) THERAPEUTIC EXERCISE - Provided verbal/tactile cueing for activities related to strengthening, flexibility, endurance, ROM performed to prevent loss of range of motion, maintain or improve muscular strength or increase flexibility, following either an injury or surgery.   (93893) MANUAL THERAPY -  Manual therapy techniques, 1 or more regions, each 15 minutes (Mobilization/manipulation, manual lymphatic drainage, manual traction) for the purpose of modulating pain, promoting relaxation,  increasing ROM, reducing/eliminating soft tissue swelling/inflammation/restriction, improving soft tissue extensibility and allowing for proper ROM for normal function with self care, mobility, lifting and ambulation    GOALS     Patient stated goal: \"to be relatively pain free\"  [] Progressing: [] Met: [] Not Met: [] Adjusted    Therapist goals for Patient:   Short Term Goals: To be achieved in: 2 weeks  1. Independent in HEP and progression per patient tolerance, in order to prevent re-injury.   [] Progressing: [] Met: [] Not Met: [] Adjusted  2. Patient will have a decrease in pain to <4/10 to facilitate improvement in movement, function, and ADLs as indicated by Functional Deficits.  [] Progressing: [] Met: [] Not Met: [] Adjusted    Long Term Goals: To be achieved in: 6-8 weeks  1. Disability index score of 25% or less for the Neck Disability Index to assist with reaching prior level of function with activities such as reaching overhead and driving.  [] Progressing: [] Met: [] Not Met: [] Adjusted  2. Patient will demonstrate increased AROM of cervical spine to WNL without pain to allow for proper joint functioning to enable

## 2024-06-25 NOTE — TELEPHONE ENCOUNTER
From: Kateryna Albert  To: Dr. Zuhair Rawls  Sent: 2024 7:19 PM EDT  Subject: Dizziness    Dr. Rawls, my physical therapist and I have been working on my neck and shoulders for nearly a month now. My last visit for this insurance authorization period is Wednesday. She wanted me to reach out to you and tell you I’ve been having major bouts of dizziness and see if you wanted to refer me to someone else. Sometimes they are in conjunction with a horrible headache and nausea, other times, just dizziness that nearly causes me to fall over. She had me do some balance exercises today and I had a really difficult time with one of them. If you could let me know what you think, I’d appreciate it. Thank you, Kateryna Albert  1975

## 2024-06-26 ENCOUNTER — HOSPITAL ENCOUNTER (OUTPATIENT)
Dept: PHYSICAL THERAPY | Age: 49
Setting detail: THERAPIES SERIES
Discharge: HOME | End: 2024-06-26
Payer: COMMERCIAL

## 2024-06-26 ENCOUNTER — HOSPITAL ENCOUNTER (OUTPATIENT)
Dept: PULMONOLOGY | Age: 49
Discharge: HOME | End: 2024-06-26
Payer: COMMERCIAL

## 2024-06-26 DIAGNOSIS — J45.20 MILD INTERMITTENT ASTHMA WITHOUT COMPLICATION: ICD-10-CM

## 2024-06-26 LAB
DLCO %PRED: 107 %
DLCO PRED: NORMAL
DLCO/VA %PRED: 103 %
DLCO/VA PRED: NORMAL
DLCO/VA: 4.32 ML/MIN/MMHG
DLCO: NORMAL
EXPIRATORY TIME-POST: NORMAL
EXPIRATORY TIME: NORMAL
FEF 25-75 %CHNG: NORMAL
FEF 25-75 POST %PRED: NORMAL
FEF 25-75% %PRED-PRE: NORMAL
FEF 25-75% PRED: NORMAL
FEF 25-75-POST: NORMAL
FEF 25-75-PRE: NORMAL
FEV1 %PRED-POST: 104 %
FEV1 %PRED-PRE: 100 %
FEV1 PRED: NORMAL
FEV1-POST: NORMAL
FEV1-PRE: NORMAL
FEV1/FVC %PRED-POST: NORMAL
FEV1/FVC %PRED-PRE: NORMAL
FEV1/FVC PRED: NORMAL
FEV1/FVC-POST: 86 %
FEV1/FVC-PRE: 82 %
FVC %PRED-POST: 97 L
FVC %PRED-PRE: 97 %
FVC PRED: NORMAL
FVC-POST: 3.77 L
FVC-PRE: 3.78 L
GAW %PRED: NORMAL
GAW PRED: NORMAL
GAW: NORMAL
IC PRE %PRED: NORMAL
IC PRED: NORMAL
IC: NORMAL
MEP: NORMAL
MIP: NORMAL
MVV %PRED-PRE: NORMAL
MVV PRED: NORMAL
MVV-PRE: NORMAL
PEF %PRED-POST: NORMAL
PEF %PRED-PRE: NORMAL
PEF PRED: NORMAL
PEF%CHNG: NORMAL
PEF-POST: NORMAL
PEF-PRE: NORMAL
RAW %PRED: NORMAL
RAW PRED: NORMAL
RAW: NORMAL
RV PRE %PRED: NORMAL
RV PRED: NORMAL
RV: NORMAL
SVC %PRED: NORMAL
SVC PRED: NORMAL
SVC: NORMAL
TLC PRE %PRED: 107 %
TLC PRED: NORMAL
TLC: NORMAL
VA %PRED: 103 %
VA PRED: NORMAL
VA: 5.54 L
VTG %PRED: NORMAL
VTG PRED: NORMAL
VTG: NORMAL

## 2024-06-26 PROCEDURE — 94060 EVALUATION OF WHEEZING: CPT

## 2024-06-26 PROCEDURE — 97110 THERAPEUTIC EXERCISES: CPT

## 2024-06-26 PROCEDURE — 94664 DEMO&/EVAL PT USE INHALER: CPT

## 2024-06-26 PROCEDURE — 94640 AIRWAY INHALATION TREATMENT: CPT

## 2024-06-26 PROCEDURE — 97140 MANUAL THERAPY 1/> REGIONS: CPT

## 2024-06-26 PROCEDURE — 6370000000 HC RX 637 (ALT 250 FOR IP): Performed by: INTERNAL MEDICINE

## 2024-06-26 PROCEDURE — 94726 PLETHYSMOGRAPHY LUNG VOLUMES: CPT

## 2024-06-26 PROCEDURE — 94729 DIFFUSING CAPACITY: CPT

## 2024-06-26 RX ORDER — ALBUTEROL SULFATE 90 UG/1
4 AEROSOL, METERED RESPIRATORY (INHALATION) ONCE
Status: COMPLETED | OUTPATIENT
Start: 2024-06-26 | End: 2024-06-26

## 2024-06-26 RX ADMIN — ALBUTEROL SULFATE 4 PUFF: 90 AEROSOL, METERED RESPIRATORY (INHALATION) at 07:28

## 2024-06-26 ASSESSMENT — PULMONARY FUNCTION TESTS
FVC_PRE: 3.78
FVC_PERCENT_PREDICTED_POST: 97
FEV1_PERCENT_PREDICTED_PRE: 100
FVC_PERCENT_PREDICTED_PRE: 97
FEV1/FVC_POST: 86
FVC_POST: 3.77
FEV1/FVC_PRE: 82
FEV1_PERCENT_PREDICTED_POST: 104

## 2024-06-26 NOTE — PLAN OF CARE
Recommendation:    [x] Continue PT 1-2x / wk for 4 weeks.   [] Hold PT, pending MD visit   [] Discharge to [facility]. Follow up with PT or MD PRN.       Physical Therapy: TREATMENT/PROGRESS NOTE   Patient: Kateryna Albert (48 y.o. female)   Examination Date: 2024   :  1975 MRN: [id number]   Visit #: 8   Insurance Allowable Auth Needed   30 []Yes    [x]No    Insurance: Payor: BC / Plan: BCBS - [place] PPO / Product Type: *No Product type* /   Insurance ID: [id number] - ([facility])  Secondary Insurance (if applicable):    Treatment Diagnosis:     ICD-10-CM    1. Neck pain  M54.2          Medical Diagnosis:  Neck pain [M54.2]   Referring Physician: Zuhair Rawls MD  PCP: Zuhair Rawls MD     Plan of care signed (Y/N):     Date of Patient follow up with Physician:      Progress Report/POC: YES, Date Range for this report: 24 to 24  POC update due: (10 visits /OR AUTH LIMITS, whichever is less)  2024                                             Precautions/ Contra-indications:           Latex allergy:  YES  Pacemaker:    NO  Contraindications for Manipulation: None  Date of Surgery: N/A  Other:    Red Flags:  None    C-SSRS Triggered by Intake questionnaire:   Patient answered 'NO' to both behavioral questions on intake.  No further screening warranted    Preferred Language for Healthcare:   [x] English       [] other:    SUBJECTIVE EXAMINATION     Patient stated complaint: Pt reports that she had pulmonary function test this morning and has had increased neck soreness/stiffness and a HA that started after that.  Pt reports that she feels about 40% improvement in sxs since starting PT. Pt reports that she no longer has R hand pain and is able to turn her head a little more.      Test used Initial score  2024   Pain Summary VAS 5/10 3/10 neck, 0/10 R hand   Functional questionnaire Neck Disability Index 40% disability 42% disability   Other:                OBJECTIVE EXAMINATION

## 2024-06-28 NOTE — PROCEDURES
Spirometry was acceptable and reproducible by ATS standards    Spirometry  Spirometry is normal.    Bronchodilator  There is no response to bronchodilator demonstrated.   [Increase in FEV1 and/or FVC => 12% of control and => 200 ml]    Lung Volumes  Lung volumes are normal.    Diffusing Capacity  Diffusing capacity is normal.      Overall Interpretation  Normal pulmonary function test without bronchodilator response.  No signs of asthma.  Asthma can be further assessed with methacholine challenge if clinically needed.    Pulmonary Function Testing Results:    FEV1 %Pred-Pre   Date Value Ref Range Status   06/26/2024 100 % Final     FEV1 %Pred-Post   Date Value Ref Range Status   06/26/2024 104 % Final     FEV1/FVC-Pre   Date Value Ref Range Status   06/26/2024 82 % Final     FEV1/FVC-Post   Date Value Ref Range Status   06/26/2024 86 % Final     TLC Pre %Pred   Date Value Ref Range Status   06/26/2024 107 % Final     DLCO %Pred   Date Value Ref Range Status   06/26/2024 107 % Final     DLCO/VA %Pred   Date Value Ref Range Status   06/26/2024 103 % Final              Obstruction severity and Restriction Severity using FEV1 %  Gold Stage Severity per ERS/ATS FEV1 % per ERS/ATS   GOLD I:  FEV1>80% Mild COPD >70   GOLD II:  FEV1 50-79% Moderate 50-70   GOLD III:  FEV1 30-49% Severe 35-50   GOLD IV:  FEV1 <30 Very Severe  <35       DCLO:  Normal:  %  Mild:  65-70%  Moderate:  41-60%  Severe:  <40%      PFT data will be scanned into the procedure tab in epic under this encounter    William Sapp MD  [facility]

## 2024-07-02 ENCOUNTER — HOSPITAL ENCOUNTER (OUTPATIENT)
Dept: PHYSICAL THERAPY | Age: 49
Setting detail: THERAPIES SERIES
Discharge: HOME | End: 2024-07-02
Payer: COMMERCIAL

## 2024-07-02 ENCOUNTER — TELEPHONE (OUTPATIENT)
Dept: INTERNAL MEDICINE CLINIC | Age: 49
End: 2024-07-02

## 2024-07-02 PROCEDURE — 97110 THERAPEUTIC EXERCISES: CPT

## 2024-07-02 PROCEDURE — 97140 MANUAL THERAPY 1/> REGIONS: CPT

## 2024-07-02 NOTE — FLOWSHEET NOTE
reaching/dressing ADL's.   [x] Progressing: [] Met: [] Not Met: [] Adjusted  3. Patient will demonstrate increased Strength of UE to at least 4+/5 throughout without pain to allow for proper functional mobility to enable patient to return to perform ADLs.   [x] Progressing: [] Met: [] Not Met: [] Adjusted  4. Patient will return to driving for 20 minutes without increased symptoms or restriction.   [x] Progressing: [] Met: [] Not Met: [] Adjusted  5. Patient will be able to sleep for 1 night without disturbances due to increased symptoms or restriction.     [x] Progressing: [] Met: [] Not Met: [] Adjusted     Overall Progression Towards Functional goals/ Treatment Progress Update:  [x] Patient is progressing as expected towards functional goals listed.    [] Progression is slowed due to complexities/Impairments listed.  [] Progression has been slowed due to co-morbidities.  [] Plan just implemented, too soon (<30days) to assess goals progression   [] Goals require adjustment due to lack of progress  [] Patient is not progressing as expected and requires additional follow up with physician  [] Other:     TREATMENT PLAN     Frequency/Duration: 2x/week for  6-8  weeks for the following treatment interventions:    Interventions:  Therapeutic Exercise (77559) including: strength training, ROM, and functional mobility  Therapeutic Activities (56859) including: functional mobility training and education.  Neuromuscular Re-education (15818) activation and proprioception, including postural re-education.    Manual Therapy (07274) as indicated to include: Passive Range of Motion, Gr I-IV mobilizations, Soft Tissue Mobilization, and Dry Needling/IASTM    Plan: Cont POC- Continue emphasis/focus on exercise progression, improving proper muscle recruitment and activation/motor control patterns, modulating pain, and improving postural awareness. Next visit plan to add new exercises  Pt plans to start vestibular therapy in the next

## 2024-07-02 NOTE — TELEPHONE ENCOUNTER
Spoke to pt gave results she stated she is still having the SOB frequently especially now with allergies. Wanting to know what the next steps are

## 2024-07-03 NOTE — PLAN OF CARE
Hypertension (I10)  [] Hyperlipidemia (E78.5)  [] Angina pectoris (I20)  [] Atherosclerosis (I70)  [] Pacemaker/Defib  [] Hx of CABG/stent/cardiac surgeries        Developmental Disorders  [] Autism (F84.0)  [] Cerebral Palsy (G80)  [] Down Syndrome (Q90.9)  [] Developmental delay     Psychological Disorders  [] Anxiety (F41.9)  [] Depression (F32.9)   [] Bipolar  [] Other:      Prior surgeries  [] Involved limb  [] Previous spinal surgery  []  section birth  [] Hysterectomy  [] Bowel / bladder surgery  [] Other relevant surgeries        Other conditions  [] Vertigo  [] Syncope  [] Kidney Failure  [] Cancer  [] Pregnancy  [] Incontinence   Other Co-morbidities not listed:  Anxiety/ Depression      OBJECTIVE EXAMINATION     Observations:  Posture: WNL    Blood Pressure:  not tested  Supine:   Seated:  Standing:     Cervical Assessment:      WNL  NT      Vertebral Artery test in sitting position:     Negative    Oculomotor Examination:    Room Light:   Spontaneous Nystagmus:  NO   NA   Gaze Holding Nystagmus:  NO   NA     Smooth Pursuits:     WNL   Saccades:       WNL (2 or less)   Convergence: NT    VOR (Cancellation):  WNL  VOR (Slow):      WNL mild dizziness   Head Impulse Test/ Head Thrust Test:     NT    DVA: Not Tested        Fixation Blocked/Frenzel/Infrared:  Not Tested     Head Neck Differentiation Test for Cervicogenic Etiology :  not tested  Subjective C/O Dizziness provoked: NO     Coordination Testing:       Finger to Nose:        WNL  Alternating pronation/supination:   WNL  Finger/Thumb opposition:  WNL  Heel to shin (sitting or supine):      WNL  Alternating Toe Tapping:       WNL    Postural Stability / VSR: To be tested NPV    Test Position Time Result   1.Normal Stance EO     2.Normal Stance EC     3.Staggered Stance EO     4.Stagger Stance EC     5.Normal Stance on Foam EO     6.Normal Stance on Foam EC     7.Fakuda Stepping test     If 6 and 7 are positive, there is a 95% sensitivity for

## 2024-07-05 ENCOUNTER — HOSPITAL ENCOUNTER (OUTPATIENT)
Dept: PHYSICAL THERAPY | Age: 49
Setting detail: THERAPIES SERIES
Discharge: HOME | End: 2024-07-05
Payer: COMMERCIAL

## 2024-07-05 DIAGNOSIS — R26.89 IMBALANCE: ICD-10-CM

## 2024-07-05 DIAGNOSIS — H83.2X2 VESTIBULAR HYPOFUNCTION OF LEFT EAR: Primary | ICD-10-CM

## 2024-07-05 DIAGNOSIS — R42 DIZZINESS: ICD-10-CM

## 2024-07-05 PROCEDURE — 97530 THERAPEUTIC ACTIVITIES: CPT

## 2024-07-05 PROCEDURE — 97112 NEUROMUSCULAR REEDUCATION: CPT

## 2024-07-05 PROCEDURE — 97161 PT EVAL LOW COMPLEX 20 MIN: CPT

## 2024-07-08 ENCOUNTER — HOSPITAL ENCOUNTER (OUTPATIENT)
Dept: PHYSICAL THERAPY | Age: 49
Setting detail: THERAPIES SERIES
Discharge: HOME | End: 2024-07-08
Payer: COMMERCIAL

## 2024-07-08 PROCEDURE — 97140 MANUAL THERAPY 1/> REGIONS: CPT

## 2024-07-08 PROCEDURE — 97110 THERAPEUTIC EXERCISES: CPT

## 2024-07-08 NOTE — FLOWSHEET NOTE
exercise progression, improving proper muscle recruitment and activation/motor control patterns, modulating pain, and improving postural awareness. Next visit plan to add new exercises  Pt plans to start vestibular therapy in the next week.     Electronically Signed by Ana Gordon, PT  Date: 07/08/2024     Note: Portions of this note have been templated and/or copied from initial evaluation, reassessments and prior notes for documentation efficiency.    Note: If patient does not return for scheduled/recommended follow up visits, this note will serve as a discharge from care along with the most recent update on progress.    Ortho Evaluation

## 2024-07-09 ENCOUNTER — OFFICE VISIT (OUTPATIENT)
Age: 49
End: 2024-07-09
Payer: COMMERCIAL

## 2024-07-09 DIAGNOSIS — F41.8 DEPRESSION WITH ANXIETY: Primary | ICD-10-CM

## 2024-07-09 PROCEDURE — 90832 PSYTX W PT 30 MINUTES: CPT | Performed by: PSYCHOLOGIST

## 2024-07-09 ASSESSMENT — PROMIS GLOBAL HEALTH SCALE
TO WHAT EXTENT ARE YOU ABLE TO CARRY OUT YOUR EVERYDAY PHYSICAL ACTIVITIES SUCH AS WALKING, CLIMBING STAIRS, CARRYING GROCERIES, OR MOVING A CHAIR [ON A SCALE OF 1 (NOT AT ALL) TO 5 (COMPLETELY)]?: MODERATELY
SUM OF RESPONSES TO QUESTIONS 2, 4, 5, & 10: 8
IN GENERAL, HOW WOULD YOU RATE YOUR PHYSICAL HEALTH [ON A SCALE OF 1 (POOR) TO 5 (EXCELLENT)]?: GOOD
IN GENERAL, WOULD YOU SAY YOUR QUALITY OF LIFE IS...[ON A SCALE OF 1 (POOR) TO 5 (EXCELLENT)]: GOOD
SUM OF RESPONSES TO QUESTIONS 3, 6, 7, & 8: 12
IN GENERAL, WOULD YOU SAY YOUR HEALTH IS...[ON A SCALE OF 1 (POOR) TO 5 (EXCELLENT)]: GOOD
IN GENERAL, HOW WOULD YOU RATE YOUR SATISFACTION WITH YOUR SOCIAL ACTIVITIES AND RELATIONSHIPS [ON A SCALE OF 1 (POOR) TO 5 (EXCELLENT)]?: FAIR
IN THE PAST 7 DAYS, HOW WOULD YOU RATE YOUR FATIGUE ON AVERAGE [ON A SCALE FROM 1 (NONE) TO 5 (VERY SEVERE)]?: SEVERE
IN THE PAST 7 DAYS, HOW OFTEN HAVE YOU BEEN BOTHERED BY EMOTIONAL PROBLEMS, SUCH AS FEELING ANXIOUS, DEPRESSED, OR IRRITABLE [ON A SCALE FROM 1 (NEVER) TO 5 (ALWAYS)]?: ALWAYS
IN GENERAL, PLEASE RATE HOW WELL YOU CARRY OUT YOUR USUAL SOCIAL ACTIVITIES (INCLUDES ACTIVITIES AT HOME, AT WORK, AND IN YOUR COMMUNITY, AND RESPONSIBILITIES AS A PARENT, CHILD, SPOUSE, EMPLOYEE, FRIEND, ETC) [ON A SCALE OF 1 (POOR) TO 5 (EXCELLENT)]?: VERY GOOD
IN THE PAST 7 DAYS, HOW WOULD YOU RATE YOUR PAIN ON AVERAGE [ON A SCALE FROM 0 (NO PAIN) TO 10 (WORST IMAGINABLE PAIN)]?: 4
IN GENERAL, HOW WOULD YOU RATE YOUR MENTAL HEALTH, INCLUDING YOUR MOOD AND YOUR ABILITY TO THINK [ON A SCALE OF 1 (POOR) TO 5 (EXCELLENT)]?: FAIR

## 2024-07-09 NOTE — PROGRESS NOTES
Behavioral Health Consultation   Katarina Valles, PhD  Clinical Psychologist  7/9/2024      Time spent with Patient: 32 minutes 11:01 -11:33  This is patient's 2nd  [facility] appointment.    Reason for Consult:   Chief Complaint   Patient presents with    Depression    Anxiety       Referring Provider: Zuhair Rawls MD    Subjective  PT Improvement Questions  How is (target problem) going for you? Same, better, or worse? same/better    What specifically has changed (if anything)?  Addressing issues with her mother   in bed at 11, asleep between 2-3.  - rumination about past interactions    Has anyone else noticed any change(s)?  If so, what? Yes: Comment: Daughter and  have provided positive feedback regarding her setting limits with her mother    What do you think is causing the change or keeping it from getting worse?  Wrote a letter about things that are difficult with her mother - read it to her - told her everything Mom denied it. Told her she was too old to change. Starting to ser limits with mom when she gets negative    Plan Implementation Questions    Recommendations to Patient:   1. [employer] watch video on anxiety     What part were you able to do? All of it  Did it help/what were the results?  Yes    FUNCTIONAL ANALYSIS (if applicable)     Risk Assessment: Patient is judged to be at  low risk for harm to self/others due to  no current thoughts/reports of suicide and homicide.     Objective  Level of distress: Low  Orientation: Person, Place, Time, and Situation  Appearance: Well-groomed, Awake, Alert, and Good eye contact  Other: Mood is euthymic    7/9/24 PROMIS-10 Scores: Physical: 12 Mental: 8    PROMIS Raw Score   WNL Symptoms /Impairment     Mild Moderate Severe   Global Physical Health 15-20 13-14 9-12 4-8   Global Mental Health 14-20 11-13 7-10 4-6     Assessment and Plan  Depression with anxiety.  Patient reports stability and stressors, however she is made significant progress

## 2024-07-10 ENCOUNTER — HOSPITAL ENCOUNTER (OUTPATIENT)
Dept: PHYSICAL THERAPY | Age: 49
Setting detail: THERAPIES SERIES
Discharge: HOME | End: 2024-07-10
Payer: COMMERCIAL

## 2024-07-10 PROCEDURE — 97140 MANUAL THERAPY 1/> REGIONS: CPT

## 2024-07-10 PROCEDURE — 97110 THERAPEUTIC EXERCISES: CPT

## 2024-07-10 NOTE — FLOWSHEET NOTE
[facility] - Outpatient Rehabilitation and Therapy [address], [place] [address] office: [phone] fax: [phone]        Physical Therapy: TREATMENT/PROGRESS NOTE   Patient: Kateryna Albert (48 y.o. female)   Examination Date: 07/10/2024   :  1975 MRN: [id number]   Visit #: 11   Insurance Allowable Auth Needed   30 []Yes    [x]No    Insurance: Payor: BCBS / Plan: BCBS - [place] PPO / Product Type: *No Product type* /   Insurance ID: [id number] - ([facility])  Secondary Insurance (if applicable):    Treatment Diagnosis:     ICD-10-CM    1. Neck pain  M54.2          Medical Diagnosis:  Neck pain [M54.2]   Referring Physician: Zuhair Rawls MD  PCP: Zuhair Rawls MD     Plan of care signed (Y/N):     Date of Patient follow up with Physician:      Progress Report/POC: NO  POC update due: (10 visits /OR AUTH LIMITS, whichever is less)  2024                                             Precautions/ Contra-indications:           Latex allergy:  YES  Pacemaker:    NO  Contraindications for Manipulation: None  Date of Surgery: N/A  Other:    Red Flags:  None    C-SSRS Triggered by Intake questionnaire:   Patient answered 'NO' to both behavioral questions on intake.  No further screening warranted    Preferred Language for Healthcare:   [x] English       [] other:    SUBJECTIVE EXAMINATION     Patient stated complaint: Pt reports that she has less R UT pain and the back of her neck feels a little better, although she still has some tightness. Pt denies HA currently but reports still having dizziness.      Test used Initial score  5/29/24 07/10/2024   Pain Summary VAS 5/10 3/10 neck, 0/10 R hand   Functional questionnaire Neck Disability Index 40% disability 42% disability   Other:                OBJECTIVE EXAMINATION     24:  (-) vertebral artery test  Rhomberg test:  30\" with narrow stance EC  30\" with modified tandem stance EC (R foot in front)  10\" with modified tandem

## 2024-07-12 ENCOUNTER — HOSPITAL ENCOUNTER (OUTPATIENT)
Dept: PHYSICAL THERAPY | Age: 49
Setting detail: THERAPIES SERIES
Discharge: HOME | End: 2024-07-12
Payer: COMMERCIAL

## 2024-07-12 PROCEDURE — 97112 NEUROMUSCULAR REEDUCATION: CPT

## 2024-07-12 PROCEDURE — 97530 THERAPEUTIC ACTIVITIES: CPT

## 2024-07-12 NOTE — FLOWSHEET NOTE
stated goal: To improve dizziness  Status:  [] Progressing: [] Met: [] Not Met: [] Adjusted    Therapist goals for Patient:   Short Term Goals: To be achieved in: 2 weeks  Independent in HEP and progression per patient tolerance, in order to progress toward full function.    Status: [] Progressing: [] Met: [] Not Met: [] Adjusted  Patient's subjective complaint of dizziness/imbalance/symptoms to decrease by 50% to tolerate functional activities.   Status: [] Progressing: [] Met: [] Not Met: [] Adjusted    Long Term Goals: To be achieved in: 6 weeks  DHI score of 20 or less to assist with return to prior level of function.    Status: [] Progressing: [] Met: [] Not Met: [] Adjusted  Patient will return to turning around and looking up without increased symptoms or restriction to work towards return to prior level of function.        Status: [] Progressing: [] Met: [] Not Met: [] Adjusted  Patient will perform normal ADLs without symptoms 90% of the time.            Status: [] Progressing: [] Met: [] Not Met: [] Adjusted  Patient will perform normal household chores without symptoms 90% of the time.            Status: [] Progressing: [] Met: [] Not Met: [] Adjusted  Patient will improve Jenna sensory organization/Modified CTSIB score to 5/7 in order to reduce fall risk.           Status: [] Progressing: [] Met: [] Not Met: [] Adjusted    Overall Progression Towards Functional goals/ Treatment Progress Update:  [] Patient is progressing as expected towards functional goals listed.    [] Progression is slowed due to complexities/Impairments listed.  [] Progression has been slowed due to co-morbidities.  [x] Plan just implemented, too soon (<30days) to assess goals progression   [] Goals require adjustment due to lack of progress  [] Patient is not progressing as expected and requires additional follow up with physician  [] Other:     TREATMENT PLAN       Plan: Cont POC- Continue emphasis/focus on exercise progression,

## 2024-07-15 ENCOUNTER — HOSPITAL ENCOUNTER (OUTPATIENT)
Dept: PHYSICAL THERAPY | Age: 49
Setting detail: THERAPIES SERIES
Discharge: HOME | End: 2024-07-15
Payer: COMMERCIAL

## 2024-07-15 PROCEDURE — 97140 MANUAL THERAPY 1/> REGIONS: CPT

## 2024-07-15 PROCEDURE — 97110 THERAPEUTIC EXERCISES: CPT

## 2024-07-15 NOTE — FLOWSHEET NOTE
Needling/IASTM    Plan: Cont POC- Continue emphasis/focus on exercise progression, improving proper muscle recruitment and activation/motor control patterns, modulating pain, and improving postural awareness. Next visit plan to add new exercises      Electronically Signed by Ana Gordon, PT  Date: 07/15/2024     Note: Portions of this note have been templated and/or copied from initial evaluation, reassessments and prior notes for documentation efficiency.    Note: If patient does not return for scheduled/recommended follow up visits, this note will serve as a discharge from care along with the most recent update on progress.    Ortho Evaluation

## 2024-07-16 RX ORDER — MONTELUKAST SODIUM 10 MG/1
10 TABLET ORAL NIGHTLY
Qty: 90 TABLET | Refills: 0 | Status: SHIPPED | OUTPATIENT
Start: 2024-07-16

## 2024-07-17 ENCOUNTER — HOSPITAL ENCOUNTER (OUTPATIENT)
Dept: PHYSICAL THERAPY | Age: 49
Setting detail: THERAPIES SERIES
Discharge: HOME | End: 2024-07-17
Payer: COMMERCIAL

## 2024-07-17 ENCOUNTER — APPOINTMENT (OUTPATIENT)
Dept: PHYSICAL THERAPY | Age: 49
End: 2024-07-17
Payer: COMMERCIAL

## 2024-07-17 PROCEDURE — 97140 MANUAL THERAPY 1/> REGIONS: CPT

## 2024-07-17 PROCEDURE — 97110 THERAPEUTIC EXERCISES: CPT

## 2024-07-17 NOTE — FLOWSHEET NOTE
[facility] - Outpatient Rehabilitation and Therapy [address], [place] [address] office: [phone] fax: [phone]        Physical Therapy: TREATMENT/PROGRESS NOTE   Patient: Kateryna Albert (48 y.o. female)   Examination Date: 2024   :  1975 MRN: [id number]   Visit #: 13   Insurance Allowable Auth Needed   30 []Yes    [x]No    Insurance: Payor: BCBS / Plan: BCBS - [place] PPO / Product Type: *No Product type* /   Insurance ID: [id number] - ([facility])  Secondary Insurance (if applicable):    Treatment Diagnosis:     ICD-10-CM    1. Neck pain  M54.2          Medical Diagnosis:  Neck pain [M54.2]   Referring Physician: Zuhair Rawls MD  PCP: Zuhair Rawls MD     Plan of care signed (Y/N):     Date of Patient follow up with Physician:      Progress Report/POC: NO  POC update due: (10 visits /OR AUTH LIMITS, whichever is less)  2024                                             Precautions/ Contra-indications:           Latex allergy:  YES  Pacemaker:    NO  Contraindications for Manipulation: None  Date of Surgery: N/A  Other:    Red Flags:  None    C-SSRS Triggered by Intake questionnaire:   Patient answered 'NO' to both behavioral questions on intake.  No further screening warranted    Preferred Language for Healthcare:   [x] English       [] other:    SUBJECTIVE EXAMINATION     Patient stated complaint: Pt reports UT tightness has improved, but has scapular tightness and a HA at the base of her skull this morning. Pt reports that she had F/U with rheumatologist yesterday, who told pt that her platelet levels are high and are continuing to increase. Pt reports rheumatologist is concerned for possibility of leukemia, F/U with rheumatologist next week for results of bloodwork taken today.      Test used Initial score  2024   Pain Summary VAS 5/10 3/10 neck, 0/10 R hand   Functional questionnaire Neck Disability Index 40% disability 42% disability

## 2024-07-18 ENCOUNTER — APPOINTMENT (OUTPATIENT)
Dept: PHYSICAL THERAPY | Age: 49
End: 2024-07-18
Payer: COMMERCIAL

## 2024-07-19 ENCOUNTER — HOSPITAL ENCOUNTER (OUTPATIENT)
Dept: PHYSICAL THERAPY | Age: 49
Setting detail: THERAPIES SERIES
Discharge: HOME | End: 2024-07-19
Payer: COMMERCIAL

## 2024-07-19 PROBLEM — Z00.00 ANNUAL PHYSICAL EXAM: Status: RESOLVED | Noted: 2024-06-19 | Resolved: 2024-07-19

## 2024-07-19 NOTE — FLOWSHEET NOTE
[facility] - Outpatient Rehabilitation and Therapy [address], [place] [address] office: [phone] fax: [phone]         Physical Therapy: TREATMENT/PROGRESS NOTE   Patient: Kateryna Albert (48 y.o. female)   Examination Date: 2024   :  1975 MRN: [id number]   Visit #: 14   Insurance Allowable Auth Needed   Mn []Yes    [x]No    Insurance: Payor: BCBS / Plan: BCBS - [place] PPO / Product Type: *No Product type* /   Insurance ID: [id number] - ([place] [employer])  Secondary Insurance (if applicable):    Treatment Diagnosis:     ICD-10-CM    1. Vestibular hypofunction of left ear  H83.2X2       2. Imbalance  R26.89       3. Dizziness  R42          Medical Diagnosis:  Dizziness [R42]   Referring Physician: Zuhari Rawls MD  PCP: Zuhair Rawls MD       Plan of care signed (Y/N): frank GALLAGHER in epic    Date of Patient follow up with Physician: 24     Progress Report/POC: NO  POC update due: (10 visits /OR AUTH LIMITS, whichever is less)  2024                                             Precautions/ Contra-indications:           Latex allergy:  NO  Pacemaker:    NO  Contraindications for Manipulation: None  Date of Surgery:   Other:    Red Flags:  None    C-SSRS Triggered by Intake questionnaire:   Patient answered 'NO' to both behavioral questions on intake.  No further screening warranted    Preferred Language for Healthcare:   [x] English       [] other:    SUBJECTIVE EXAMINATION     Patient stated complaint/comment: : Patient feeling nauseated this date. States she got really sick after last vestibular PT visit. Admitting to having an elevated Platelet count which will currently addressing through medical management. She is also scheduled to see Audiologist and ENT in a week as well.     Pt reports her dizziness 0-3/10 but yesterday with ex dizziness/nausea.       /:Patient reports that she began experiencing dizziness /spinning several months ago from an insidious onset.

## 2024-07-22 ENCOUNTER — HOSPITAL ENCOUNTER (OUTPATIENT)
Dept: PHYSICAL THERAPY | Age: 49
Setting detail: THERAPIES SERIES
Discharge: HOME | End: 2024-07-22
Payer: COMMERCIAL

## 2024-07-22 PROCEDURE — 97110 THERAPEUTIC EXERCISES: CPT

## 2024-07-22 PROCEDURE — 97140 MANUAL THERAPY 1/> REGIONS: CPT

## 2024-07-22 NOTE — FLOWSHEET NOTE
allow for proper joint functioning to enable patient to driving, reaching/dressing ADL's.   [x] Progressing: [] Met: [] Not Met: [] Adjusted  3. Patient will demonstrate increased Strength of UE to at least 4+/5 throughout without pain to allow for proper functional mobility to enable patient to return to perform ADLs.   [x] Progressing: [] Met: [] Not Met: [] Adjusted  4. Patient will return to driving for 20 minutes without increased symptoms or restriction.   [x] Progressing: [] Met: [] Not Met: [] Adjusted  5. Patient will be able to sleep for 1 night without disturbances due to increased symptoms or restriction.     [x] Progressing: [] Met: [] Not Met: [] Adjusted     Overall Progression Towards Functional goals/ Treatment Progress Update:  [x] Patient is progressing as expected towards functional goals listed.    [] Progression is slowed due to complexities/Impairments listed.  [] Progression has been slowed due to co-morbidities.  [] Plan just implemented, too soon (<30days) to assess goals progression   [] Goals require adjustment due to lack of progress  [] Patient is not progressing as expected and requires additional follow up with physician  [] Other:     TREATMENT PLAN     Frequency/Duration: 2x/week for  6-8  weeks for the following treatment interventions:    Interventions:  Therapeutic Exercise (90364) including: strength training, ROM, and functional mobility  Therapeutic Activities (57385) including: functional mobility training and education.  Neuromuscular Re-education (04131) activation and proprioception, including postural re-education.    Manual Therapy (93579) as indicated to include: Passive Range of Motion, Gr I-IV mobilizations, Soft Tissue Mobilization, and Dry Needling/IASTM    Plan: Cont POC- Continue emphasis/focus on exercise progression, improving proper muscle recruitment and activation/motor control patterns, modulating pain, and improving postural awareness. Next visit plan to

## 2024-07-23 ENCOUNTER — PROCEDURE VISIT (OUTPATIENT)
Dept: AUDIOLOGY | Age: 49
End: 2024-07-23
Payer: COMMERCIAL

## 2024-07-23 ENCOUNTER — OFFICE VISIT (OUTPATIENT)
Dept: ENT CLINIC | Age: 49
End: 2024-07-23
Payer: COMMERCIAL

## 2024-07-23 VITALS
TEMPERATURE: 96.9 F | RESPIRATION RATE: 16 BRPM | DIASTOLIC BLOOD PRESSURE: 69 MMHG | BODY MASS INDEX: 43.32 KG/M2 | SYSTOLIC BLOOD PRESSURE: 113 MMHG | WEIGHT: 276 LBS | HEIGHT: 67 IN | HEART RATE: 85 BPM

## 2024-07-23 DIAGNOSIS — R42 DIZZINESS: Primary | ICD-10-CM

## 2024-07-23 DIAGNOSIS — J30.1 SEASONAL ALLERGIC RHINITIS DUE TO POLLEN: ICD-10-CM

## 2024-07-23 DIAGNOSIS — H90.3 SENSORINEURAL HEARING LOSS, BILATERAL: Primary | ICD-10-CM

## 2024-07-23 DIAGNOSIS — R42 DIZZINESS: ICD-10-CM

## 2024-07-23 DIAGNOSIS — G43.809 VESTIBULAR MIGRAINE: ICD-10-CM

## 2024-07-23 DIAGNOSIS — H90.3 SENSORINEURAL HEARING LOSS (SNHL) OF BOTH EARS: ICD-10-CM

## 2024-07-23 PROCEDURE — 92557 COMPREHENSIVE HEARING TEST: CPT

## 2024-07-23 PROCEDURE — 92504 EAR MICROSCOPY EXAMINATION: CPT | Performed by: OTOLARYNGOLOGY

## 2024-07-23 PROCEDURE — 99204 OFFICE O/P NEW MOD 45 MIN: CPT | Performed by: OTOLARYNGOLOGY

## 2024-07-23 PROCEDURE — 3078F DIAST BP <80 MM HG: CPT | Performed by: OTOLARYNGOLOGY

## 2024-07-23 PROCEDURE — 92567 TYMPANOMETRY: CPT

## 2024-07-23 PROCEDURE — 3074F SYST BP LT 130 MM HG: CPT | Performed by: OTOLARYNGOLOGY

## 2024-07-23 ASSESSMENT — ENCOUNTER SYMPTOMS
TROUBLE SWALLOWING: 0
EYE DISCHARGE: 0
PHOTOPHOBIA: 0
VOMITING: 0
NAUSEA: 0
DIARRHEA: 0
COUGH: 0
SINUS PAIN: 0
FACIAL SWELLING: 0
SORE THROAT: 0
COLOR CHANGE: 0
BACK PAIN: 0
VOICE CHANGE: 0
SINUS PRESSURE: 0
RHINORRHEA: 1
WHEEZING: 0
CHOKING: 0
CONSTIPATION: 0
STRIDOR: 0
BLOOD IN STOOL: 0
SHORTNESS OF BREATH: 0
EYE ITCHING: 0

## 2024-07-23 NOTE — PROGRESS NOTES
[facility]  [address] , [address]  [place] [address]  P: [phone]  F: [phone]       Patient     Kateryna Albert  1975    ChiefComplaint     Chief Complaint   Patient presents with    Dizziness     Going on for 5 months now       History of Present Illness     Kateryna Albert is a pleasant 48 y.o. female who presents as a new patient for dizziness.  Patient has had dizziness for the past 5 months.  She has been undergoing vestibular therapy.  Typically, balance therapy worsens the dizzy symptoms.  Symptoms occur daily lasting minutes.  Headaches have persisted with the dizziness.  She is also undergoing PT for her neck.    The dizziness began out of the blue 5 months ago.  It is worse with movements.  It is worse with going to the grocery store and walking on the [place].  Is worse when she is working in the kitchen moving her hands around looking back-and-forth.  She describes it as a motion sensation of rocking, occasional spinning, occasional imbalance.  She does experience associated brain fog, photophobia, phonophobia and headaches.  She does have a history of migraine, typically cyclical with her menstrual cycle.  She is on norethindrone hormone replacement.  She takes rizatriptan as needed for headaches.  She takes trazodone and the Laza Joseph for sleep and anxiety.  She also has a significant history of allergic rhinitis.  Her allergy symptoms have been much worse over the past 4 to 5 months.  She takes Claritin and Singulair.  She does not use any nasal sprays.    A month or 2 after the dizziness started, she began experiencing some pain whenever she turns her head to the right.  She is undergoing PT for her neck currently.    She denies any associated fluctuation of hearing, tinnitus, otalgia or otorrhea.  Denies a history of ear infections or ear surgeries.    Past Medical History     Past Medical History:   Diagnosis Date    ADHD (attention deficit hyperactivity disorder)

## 2024-07-23 NOTE — PROGRESS NOTES
Kateryna Albert   1975, 48 y.o. female   [id number]       Referring Provider: Marcelino Sanches DO  Referral Type: In an order in Epic    Reason for Visit: Evaluation of suspected change in hearing, tinnitus, or balance.    ADULT AUDIOLOGIC EVALUATION      Kateryna Albert is a 48 y.o. female seen today, 7/23/2024 , for an initial audiologic evaluation.  Patient was seen by Marcelino Sanches DO following today's evaluation.    AUDIOLOGIC AND OTHER PERTINENT MEDICAL HISTORY:      Kateryna Albert reports intermittent dizziness for the past 5 months. Onset was spontaneous and described as a spinning sensation and lightheadedness. She notes sometimes it will last a full day other times it will be only for a few minutes. Dizziness can be position triggered whether sitting to standing, laying down, or moving head quickly. Patient has a previous history of migraines associated with menstrual cycle, but no longer has regular periods.  Has been seen by PT in [facility]. Patient discontinued as therapy seemed to make her symptoms worse and cause emesis.Family history of hearing loss in father at a young age    She denied otalgia, aural fullness, otorrhea, tinnitus, history of noise exposure, history of head trauma, and history of ear surgery    Date: 7/23/2024     IMPRESSIONS:      Today's results revealed symmetric sensorineural hearing loss with Excellent speech understanding when in quiet, bilaterally. Tympanometry indicates normal middle ear function. Discussed test results and implications with patient. Discussed possible benefits of amplification.  Further vestibular testing recommended if medically indicated.  Follow medical recommendations of Marcelino Sanches DO.     ASSESSMENT AND FINDINGS:     Otoscopy unremarkable.    RIGHT EAR:  Hearing Sensitivity: Normal sloping to moderate sensorineural hearing loss   Speech Recognition Threshold: 10 dB HL  Word Recognition: Excellent 100%, based on NU-6 25-word list at 50 dBHL using

## 2024-07-24 ENCOUNTER — OFFICE VISIT (OUTPATIENT)
Age: 49
End: 2024-07-24
Payer: COMMERCIAL

## 2024-07-24 ENCOUNTER — APPOINTMENT (OUTPATIENT)
Dept: PHYSICAL THERAPY | Age: 49
End: 2024-07-24
Payer: COMMERCIAL

## 2024-07-24 DIAGNOSIS — F41.8 DEPRESSION WITH ANXIETY: Primary | ICD-10-CM

## 2024-07-24 PROCEDURE — 90832 PSYTX W PT 30 MINUTES: CPT | Performed by: PSYCHOLOGIST

## 2024-07-24 NOTE — PROGRESS NOTES
Behavioral Health Consultation   Katarina Valles, PhD  Clinical Psychologist  7/24/2024      Time spent with Patient: 33 minutes 8:02-8:35  This is patient's 3rd  [facility] appointment.    Reason for Consult:   Chief Complaint   Patient presents with    Depression    Anxiety       Referring Provider: Zuhair Rawls MD    Subjective  PT Improvement Questions  How is (target problem) going for you? Same, better, or worse? better    What specifically has changed (if anything)?  Stressors with mom persist - is seeing ripple effects of her conversation   Her mom stopped talking to her, then her aunt Bindu reached out to ask her to re-engage. Is holding her ground. PT noting muscles are less tight since not talking for a week. Is setting limits to protect herself.    Has anyone else noticed any change(s)?  If so, what? Yes: Comment:  and daughter see she is much happier and at peace    What do you think is causing the change or keeping it from getting worse?  Feeling very good about showing up to advocate for herself with her mom.    Plan Implementation Questions    Recommendations to Patient:   1. Worry/planning time hours before bed   2. Go to bed closer to the time you fall asleep - 12 or 1  3. Listen to music/video -breathing     What part were you able to do?  N/a  Did it help/what were the results?  n/a    FUNCTIONAL ANALYSIS (if applicable)     Risk Assessment: Patient is judged to be at  low risk for harm to self/others due to  no current thoughts/reports of suicide and homicide.     Objective  Level of distress: Low  Orientation: Person, Place, Time, and Situation  Appearance: Well-groomed, Awake, Alert, and Good eye contact  Other: mood is euthymic      7/24/24 PROMIS-10 Scores: Physical: 12 Mental: 10    PROMIS Raw Score   WNL Symptoms /Impairment     Mild Moderate Severe   Global Physical Health 15-20 13-14 9-12 4-8   Global Mental Health 14-20 11-13 7-10 4-6     Assessment and Plan  Depression with anxiety.

## 2024-07-25 ENCOUNTER — HOSPITAL ENCOUNTER (OUTPATIENT)
Dept: PHYSICAL THERAPY | Age: 49
Setting detail: THERAPIES SERIES
Discharge: HOME | End: 2024-07-25
Payer: COMMERCIAL

## 2024-07-25 PROCEDURE — 97140 MANUAL THERAPY 1/> REGIONS: CPT

## 2024-07-25 PROCEDURE — 97110 THERAPEUTIC EXERCISES: CPT

## 2024-07-25 NOTE — PLAN OF CARE
[facility] - [facility] [address], [place] [address] office: [phone] fax: [phone]     Physical Therapy Re-Certification Plan of Care    Dear Zuhair Rawls MD  ,    We had the pleasure of treating the following patient for physical therapy services at [facility]. A summary of our findings can be found in the updated assessment below.  This includes our plan of care.  If you have any questions or concerns regarding these findings, please do not hesitate to contact me at the office phone number checked above.  Thank you for the referral.     Physician Signature:________________________________Date:__________________  By signing above (or electronic signature), therapist's plan is approved by physician      Functional Outcome: NDI 34% disability   Kateryna Albert 1975 continues to present with functional deficits in ROM, strength symmetry, endurance of strength, cardiovascular endurance, and eccentric control  limiting ability with reaching overhead, carrying items, lifting items, light home activity, and heavy home activity .  During therapy this date, patient required verbal cueing, tactile cueing, modification of technique, progression of exercises and program, and manual interventions for exercise progression, improving proper muscle recruitment and activation/motor control patterns, and improving postural awareness. Patient will continue to benefit from ongoing evaluation and advanced clinical decision from a Physical Therapist to improve pain control, ROM, muscle strength, neuromuscular control, endurance, and ADL status to safely return to PLOF and ADLs/IADLs without symptoms or restrictions.    Overall Response to Treatment:  Patient is responding well to treatment and improvement is noted with regards to goals    Total Visits: 16     Recommendation:    [x] Continue PT 1-2x / wk for 4 weeks.   [] Hold PT,

## 2024-07-26 ENCOUNTER — APPOINTMENT (OUTPATIENT)
Dept: PHYSICAL THERAPY | Age: 49
End: 2024-07-26
Payer: COMMERCIAL

## 2024-07-28 ENCOUNTER — CLINICAL DOCUMENTATION (OUTPATIENT)
Dept: INTERNAL MEDICINE CLINIC | Age: 49
End: 2024-07-28

## 2024-07-30 ENCOUNTER — APPOINTMENT (OUTPATIENT)
Dept: PHYSICAL THERAPY | Age: 49
End: 2024-07-30
Payer: COMMERCIAL

## 2024-08-05 ENCOUNTER — OFFICE VISIT (OUTPATIENT)
Dept: INTERNAL MEDICINE CLINIC | Age: 49
End: 2024-08-05
Payer: COMMERCIAL

## 2024-08-05 VITALS
HEIGHT: 67 IN | HEART RATE: 96 BPM | BODY MASS INDEX: 44.26 KG/M2 | WEIGHT: 282 LBS | OXYGEN SATURATION: 98 % | SYSTOLIC BLOOD PRESSURE: 126 MMHG | DIASTOLIC BLOOD PRESSURE: 84 MMHG | TEMPERATURE: 97.3 F

## 2024-08-05 DIAGNOSIS — U07.1 COVID: Primary | ICD-10-CM

## 2024-08-05 PROCEDURE — 3079F DIAST BP 80-89 MM HG: CPT | Performed by: STUDENT IN AN ORGANIZED HEALTH CARE EDUCATION/TRAINING PROGRAM

## 2024-08-05 PROCEDURE — 99213 OFFICE O/P EST LOW 20 MIN: CPT | Performed by: STUDENT IN AN ORGANIZED HEALTH CARE EDUCATION/TRAINING PROGRAM

## 2024-08-05 PROCEDURE — 3074F SYST BP LT 130 MM HG: CPT | Performed by: STUDENT IN AN ORGANIZED HEALTH CARE EDUCATION/TRAINING PROGRAM

## 2024-08-05 RX ORDER — ALBUTEROL SULFATE 90 UG/1
2 AEROSOL, METERED RESPIRATORY (INHALATION) EVERY 6 HOURS PRN
Qty: 18 G | Refills: 5 | Status: SHIPPED | OUTPATIENT
Start: 2024-08-05

## 2024-08-05 ASSESSMENT — ENCOUNTER SYMPTOMS
VOMITING: 0
SINUS PRESSURE: 1
ABDOMINAL PAIN: 0
NAUSEA: 0
SHORTNESS OF BREATH: 0
WHEEZING: 0
SORE THROAT: 0
COUGH: 1
DIARRHEA: 1

## 2024-08-05 NOTE — PROGRESS NOTES
2024    Kateryna Albert (:  1975) is a 48 y.o. female, here for evaluation of the following medical concerns:    Chief Complaint   Patient presents with    post Covid, cough, congestion         HPI    Patient complains of achiness, cough, head congestion, diarrhea. She feels her symptoms have improved significantly since onset. She feels her symptoms have plateaued since 3 days ago, hasn't been improving much. She took advil 3 days ago for aches. She is using ryaltris nasal spray.      Review of Systems   Constitutional:  Negative for chills and fever.   HENT:  Positive for congestion and sinus pressure. Negative for sore throat.    Respiratory:  Positive for cough. Negative for shortness of breath and wheezing.    Cardiovascular:  Negative for chest pain.   Gastrointestinal:  Positive for diarrhea. Negative for abdominal pain, nausea and vomiting.       Prior to Visit Medications    Medication Sig Taking? Authorizing Provider   albuterol sulfate HFA (PROAIR HFA) 108 (90 Base) MCG/ACT inhaler Inhale 2 puffs into the lungs every 6 hours as needed for Wheezing Yes Siobhan Senior MD   montelukast (SINGULAIR) 10 MG tablet TAKE ONE TABLET BY MOUTH ONCE NIGHTLY Yes Sheyla Real MD   traZODone (DESYREL) 100 MG tablet Take 1 tablet by mouth nightly as needed for Sleep Yes Moise Romero MD   spironolactone-hydroCHLOROthiazide (ALDACTAZIDE) 25-25 MG per tablet TAKE 1 TABLET BY MOUTH DAILY Yes Zuhair Rawls MD   losartan (COZAAR) 100 MG tablet TAKE 1 TABLET BY MOUTH DAILY Yes Zuhair Rawls MD   pantoprazole (PROTONIX) 40 MG tablet TAKE 1 TABLET BY MOUTH EVERY MORNING AND TAKE 1 TABLET BY MOUTH EVERY NIGHT AT BEDTIME Yes Zuhair Rawls MD   vilazodone HCl (VIIBRYD) 20 MG TABS Take 1 tablet by mouth daily Yes Moise Romero MD   budesonide (PULMICORT) 1 MG/2ML nebulizer suspension Take 2 mLs by nebulization 2 times daily Yes Sheyla Real MD   ipratropium 0.5 mg-albuterol 2.5 mg (DUONEB) 0.5-2.5

## 2024-08-05 NOTE — ASSESSMENT & PLAN NOTE
COVID-positive 7/26/2024.  Patient has improved, however recovery has been slower than anticipated.  No shortness of breath, chest pain, fever.  Constellation of symptoms is consistent with viral syndrome rather than bacterial superinfection.  - Continue symptom relief with over-the-counter therapies and nasal spray  - Continue to monitor for resolution of symptoms  - If patient feels no improvement within the next 4 to 5 days she is to reach out to our office

## 2024-08-06 ENCOUNTER — APPOINTMENT (OUTPATIENT)
Dept: PHYSICAL THERAPY | Age: 49
End: 2024-08-06
Payer: COMMERCIAL

## 2024-08-13 ENCOUNTER — HOSPITAL ENCOUNTER (OUTPATIENT)
Dept: PHYSICAL THERAPY | Age: 49
Setting detail: THERAPIES SERIES
Discharge: HOME | End: 2024-08-13
Payer: COMMERCIAL

## 2024-08-13 ENCOUNTER — OFFICE VISIT (OUTPATIENT)
Age: 49
End: 2024-08-13
Payer: COMMERCIAL

## 2024-08-13 ENCOUNTER — APPOINTMENT (OUTPATIENT)
Dept: PHYSICAL THERAPY | Age: 49
End: 2024-08-13
Payer: COMMERCIAL

## 2024-08-13 DIAGNOSIS — F41.8 DEPRESSION WITH ANXIETY: Primary | ICD-10-CM

## 2024-08-13 PROCEDURE — 97140 MANUAL THERAPY 1/> REGIONS: CPT

## 2024-08-13 PROCEDURE — 90832 PSYTX W PT 30 MINUTES: CPT | Performed by: PSYCHOLOGIST

## 2024-08-13 PROCEDURE — 97110 THERAPEUTIC EXERCISES: CPT

## 2024-08-13 ASSESSMENT — PROMIS GLOBAL HEALTH SCALE
TO WHAT EXTENT ARE YOU ABLE TO CARRY OUT YOUR EVERYDAY PHYSICAL ACTIVITIES SUCH AS WALKING, CLIMBING STAIRS, CARRYING GROCERIES, OR MOVING A CHAIR [ON A SCALE OF 1 (NOT AT ALL) TO 5 (COMPLETELY)]?: MOSTLY
IN GENERAL, PLEASE RATE HOW WELL YOU CARRY OUT YOUR USUAL SOCIAL ACTIVITIES (INCLUDES ACTIVITIES AT HOME, AT WORK, AND IN YOUR COMMUNITY, AND RESPONSIBILITIES AS A PARENT, CHILD, SPOUSE, EMPLOYEE, FRIEND, ETC) [ON A SCALE OF 1 (POOR) TO 5 (EXCELLENT)]?: VERY GOOD
SUM OF RESPONSES TO QUESTIONS 3, 6, 7, & 8: 14
SUM OF RESPONSES TO QUESTIONS 2, 4, 5, & 10: 9
IN THE PAST 7 DAYS, HOW WOULD YOU RATE YOUR FATIGUE ON AVERAGE [ON A SCALE FROM 1 (NONE) TO 5 (VERY SEVERE)]?: MODERATE
IN GENERAL, HOW WOULD YOU RATE YOUR SATISFACTION WITH YOUR SOCIAL ACTIVITIES AND RELATIONSHIPS [ON A SCALE OF 1 (POOR) TO 5 (EXCELLENT)]?: FAIR
IN THE PAST 7 DAYS, HOW OFTEN HAVE YOU BEEN BOTHERED BY EMOTIONAL PROBLEMS, SUCH AS FEELING ANXIOUS, DEPRESSED, OR IRRITABLE [ON A SCALE FROM 1 (NEVER) TO 5 (ALWAYS)]?: OFTEN
IN GENERAL, HOW WOULD YOU RATE YOUR MENTAL HEALTH, INCLUDING YOUR MOOD AND YOUR ABILITY TO THINK [ON A SCALE OF 1 (POOR) TO 5 (EXCELLENT)]?: GOOD
IN THE PAST 7 DAYS, HOW WOULD YOU RATE YOUR PAIN ON AVERAGE [ON A SCALE FROM 0 (NO PAIN) TO 10 (WORST IMAGINABLE PAIN)]?: 4
IN GENERAL, WOULD YOU SAY YOUR HEALTH IS...[ON A SCALE OF 1 (POOR) TO 5 (EXCELLENT)]: GOOD
IN GENERAL, HOW WOULD YOU RATE YOUR PHYSICAL HEALTH [ON A SCALE OF 1 (POOR) TO 5 (EXCELLENT)]?: GOOD
IN GENERAL, WOULD YOU SAY YOUR QUALITY OF LIFE IS...[ON A SCALE OF 1 (POOR) TO 5 (EXCELLENT)]: FAIR

## 2024-08-13 NOTE — PROGRESS NOTES
Behavioral Health Consultation   Katarina Valles, PhD  Clinical Psychologist  8/13/2024      Time spent with Patient: 30 minutes 9:30 - 10:00  This is patient's 4th  [facility] appointment.    Reason for Consult:   Chief Complaint   Patient presents with    Depression    Anxiety       Referring Provider: Zuhair Rawls MD    Subjective  PT Improvement Questions  How is (target problem) going for you? Same, better, or worse? same    What specifically has changed (if anything)?  Had covid for close to 2 weeks. In conversation with mom, is setting limits with her when she becomes negative.   Daughter starting [employer] 8/26, first in person since 6th grade    Has anyone else noticed any change(s)?  If so, what? Yes: Comment: spouse sees how hard she is working    What do you think is causing the change or keeping it from getting worse?  Talked to mom about her interactions , she was neutral for close to a week and a half. Then started getting negative again - she didn't respond and the conversation ended. Problem solving with her to reduce conflict. In positive connection with spouse. - being kinder to her    Plan Implementation Questions    Recommendations to Patient:   1. Continue to set limits      What part were you able to do? All of it  Did it help/what were the results?  Yes    FUNCTIONAL ANALYSIS (if applicable)     Risk Assessment: Patient is judged to be at  low risk for harm to self/others due to  no current thoughts/reports of suicide and homicide.     Objective  Level of distress: Low  Orientation: Person, Place, Time, and Situation  Appearance: Well-groomed, Awake, Alert, and Good eye contact  Other: mood is euthymic      8/13/24 PROMIS-10 Scores: Physical: 14 Mental: 9    PROMIS Raw Score   WNL Symptoms /Impairment     Mild Moderate Severe   Global Physical Health 15-20 13-14 9-12 4-8   Global Mental Health 14-20 11-13 7-10 4-6     Assessment and Plan  Depression with anxiety. Pt reports ongoing improvement in

## 2024-08-13 NOTE — FLOWSHEET NOTE
will demonstrate increased AROM of cervical spine to WNL without pain to allow for proper joint functioning to enable patient to driving, reaching/dressing ADL's.   [x] Progressing: [] Met: [] Not Met: [] Adjusted  3. Patient will demonstrate increased Strength of UE to at least 4+/5 throughout without pain to allow for proper functional mobility to enable patient to return to perform ADLs.   [x] Progressing: [] Met: [] Not Met: [] Adjusted  4. Patient will return to driving for 20 minutes without increased symptoms or restriction.   [x] Progressing: [] Met: [] Not Met: [] Adjusted  5. Patient will be able to sleep for 1 night without disturbances due to increased symptoms or restriction.     [x] Progressing: [] Met: [] Not Met: [] Adjusted     Overall Progression Towards Functional goals/ Treatment Progress Update:  [x] Patient is progressing as expected towards functional goals listed.    [] Progression is slowed due to complexities/Impairments listed.  [] Progression has been slowed due to co-morbidities.  [] Plan just implemented, too soon (<30days) to assess goals progression   [] Goals require adjustment due to lack of progress  [] Patient is not progressing as expected and requires additional follow up with physician  [] Other:     TREATMENT PLAN     Frequency/Duration: 2x/week for  6-8  weeks for the following treatment interventions:    Interventions:  Therapeutic Exercise (84838) including: strength training, ROM, and functional mobility  Therapeutic Activities (22919) including: functional mobility training and education.  Neuromuscular Re-education (54764) activation and proprioception, including postural re-education.    Manual Therapy (93091) as indicated to include: Passive Range of Motion, Gr I-IV mobilizations, Soft Tissue Mobilization, and Dry Needling/IASTM    Plan: Cont POC- Continue emphasis/focus on exercise progression, improving proper muscle recruitment and activation/motor control patterns,

## 2024-08-21 ENCOUNTER — HOSPITAL ENCOUNTER (OUTPATIENT)
Dept: PHYSICAL THERAPY | Age: 49
Setting detail: THERAPIES SERIES
Discharge: HOME | End: 2024-08-21
Payer: COMMERCIAL

## 2024-08-21 PROCEDURE — 97140 MANUAL THERAPY 1/> REGIONS: CPT

## 2024-08-21 PROCEDURE — 97110 THERAPEUTIC EXERCISES: CPT

## 2024-08-21 NOTE — FLOWSHEET NOTE
Index to assist with reaching prior level of function with activities such as reaching overhead and driving.  [] Progressing: [] Met: [x] Not Met: [] Adjusted  2. Patient will demonstrate increased AROM of cervical spine to WNL without pain to allow for proper joint functioning to enable patient to driving, reaching/dressing ADL's.   [x] Progressing: [] Met: [] Not Met: [] Adjusted  3. Patient will demonstrate increased Strength of UE to at least 4+/5 throughout without pain to allow for proper functional mobility to enable patient to return to perform ADLs.   [x] Progressing: [] Met: [] Not Met: [] Adjusted  4. Patient will return to driving for 20 minutes without increased symptoms or restriction.   [x] Progressing: [] Met: [] Not Met: [] Adjusted  5. Patient will be able to sleep for 1 night without disturbances due to increased symptoms or restriction.     [x] Progressing: [] Met: [] Not Met: [] Adjusted     Overall Progression Towards Functional goals/ Treatment Progress Update:  [x] Patient is progressing as expected towards functional goals listed.    [] Progression is slowed due to complexities/Impairments listed.  [] Progression has been slowed due to co-morbidities.  [] Plan just implemented, too soon (<30days) to assess goals progression   [] Goals require adjustment due to lack of progress  [] Patient is not progressing as expected and requires additional follow up with physician  [] Other:     TREATMENT PLAN     Frequency/Duration: 2x/week for  6-8  weeks for the following treatment interventions:    Interventions:  Therapeutic Exercise (84000) including: strength training, ROM, and functional mobility  Therapeutic Activities (26865) including: functional mobility training and education.  Neuromuscular Re-education (30409) activation and proprioception, including postural re-education.    Manual Therapy (80340) as indicated to include: Passive Range of Motion, Gr I-IV mobilizations, Soft Tissue

## 2024-08-27 ENCOUNTER — OFFICE VISIT (OUTPATIENT)
Age: 49
End: 2024-08-27
Payer: COMMERCIAL

## 2024-08-27 DIAGNOSIS — F41.8 DEPRESSION WITH ANXIETY: Primary | ICD-10-CM

## 2024-08-27 PROCEDURE — 90832 PSYTX W PT 30 MINUTES: CPT | Performed by: PSYCHOLOGIST

## 2024-08-27 ASSESSMENT — PROMIS GLOBAL HEALTH SCALE
IN GENERAL, PLEASE RATE HOW WELL YOU CARRY OUT YOUR USUAL SOCIAL ACTIVITIES (INCLUDES ACTIVITIES AT HOME, AT WORK, AND IN YOUR COMMUNITY, AND RESPONSIBILITIES AS A PARENT, CHILD, SPOUSE, EMPLOYEE, FRIEND, ETC) [ON A SCALE OF 1 (POOR) TO 5 (EXCELLENT)]?: VERY GOOD
IN THE PAST 7 DAYS, HOW WOULD YOU RATE YOUR FATIGUE ON AVERAGE [ON A SCALE FROM 1 (NONE) TO 5 (VERY SEVERE)]?: MODERATE
IN GENERAL, WOULD YOU SAY YOUR HEALTH IS...[ON A SCALE OF 1 (POOR) TO 5 (EXCELLENT)]: GOOD
IN THE PAST 7 DAYS, HOW OFTEN HAVE YOU BEEN BOTHERED BY EMOTIONAL PROBLEMS, SUCH AS FEELING ANXIOUS, DEPRESSED, OR IRRITABLE [ON A SCALE FROM 1 (NEVER) TO 5 (ALWAYS)]?: ALWAYS
SUM OF RESPONSES TO QUESTIONS 3, 6, 7, & 8: 14
SUM OF RESPONSES TO QUESTIONS 2, 4, 5, & 10: 6
IN THE PAST 7 DAYS, HOW WOULD YOU RATE YOUR PAIN ON AVERAGE [ON A SCALE FROM 0 (NO PAIN) TO 10 (WORST IMAGINABLE PAIN)]?: 4
IN GENERAL, HOW WOULD YOU RATE YOUR PHYSICAL HEALTH [ON A SCALE OF 1 (POOR) TO 5 (EXCELLENT)]?: GOOD
IN GENERAL, HOW WOULD YOU RATE YOUR SATISFACTION WITH YOUR SOCIAL ACTIVITIES AND RELATIONSHIPS [ON A SCALE OF 1 (POOR) TO 5 (EXCELLENT)]?: POOR
TO WHAT EXTENT ARE YOU ABLE TO CARRY OUT YOUR EVERYDAY PHYSICAL ACTIVITIES SUCH AS WALKING, CLIMBING STAIRS, CARRYING GROCERIES, OR MOVING A CHAIR [ON A SCALE OF 1 (NOT AT ALL) TO 5 (COMPLETELY)]?: MOSTLY
IN GENERAL, HOW WOULD YOU RATE YOUR MENTAL HEALTH, INCLUDING YOUR MOOD AND YOUR ABILITY TO THINK [ON A SCALE OF 1 (POOR) TO 5 (EXCELLENT)]?: POOR
IN GENERAL, WOULD YOU SAY YOUR QUALITY OF LIFE IS...[ON A SCALE OF 1 (POOR) TO 5 (EXCELLENT)]: GOOD

## 2024-08-27 NOTE — PROGRESS NOTES
Behavioral Health Consultation   Katarina Valles, PhD  Clinical Psychologist  8/27/2024      Time spent with Patient: 33 minutes 10:31 -11:04  This is patient's 5th  [facility] appointment.    Reason for Consult:   Chief Complaint   Patient presents with    Depression    Anxiety       Referring Provider: Zuhair Rawls MD    Subjective  PT Improvement Questions  How is (target problem) going for you? Same, better, or worse? worse    What specifically has changed (if anything)?  Is finding myself fighting myself a lot\"  Lissette had her first day of classes yesterday - was stressful. VINAYAK was in hospital last week - concerns about heart attack. Mom continues to drain her, wants to limit contact - she's calling twice a day     Has anyone else noticed any change(s)?  If so, what? N/a    What do you think is causing the change or keeping it from getting worse?  Being very supportive of Lissette, working to set limits    Plan Implementation Questions    Recommendations to Patient:   1. Positively reinforce the behaviors you want to see in your mom, continue to set limits      What part were you able to do? All of it  Did it help/what were the results?  Yes    FUNCTIONAL ANALYSIS (if applicable)     Risk Assessment: Patient is judged to be at  low risk for harm to self/others due to  no current thoughts/reports of suicide and homicide.     Objective  Level of distress: Low  Orientation: Person, Place, Time, and Situation  Appearance: Well-groomed, Awake, Alert, and Good eye contact  Other: mood is euthymic      8/27/24 PROMIS-10 Scores: Physical: 14 Mental: 6    PROMIS Raw Score   WNL Symptoms /Impairment     Mild Moderate Severe   Global Physical Health 15-20 13-14 9-12 4-8   Global Mental Health 14-20 11-13 7-10 4-6     Assessment and Plan  Depression with anxiety.. Pt reports exacerbation in stressors as her daughter just started college yesterday, her MIL had a health scare,and issues with her own mother persist. Visit spent

## 2024-08-29 ENCOUNTER — HOSPITAL ENCOUNTER (OUTPATIENT)
Dept: PHYSICAL THERAPY | Age: 49
Setting detail: THERAPIES SERIES
Discharge: HOME | End: 2024-08-29
Payer: COMMERCIAL

## 2024-08-29 PROCEDURE — 97140 MANUAL THERAPY 1/> REGIONS: CPT

## 2024-08-29 PROCEDURE — 97110 THERAPEUTIC EXERCISES: CPT

## 2024-08-29 NOTE — FLOWSHEET NOTE
[facility] - Outpatient Rehabilitation and Therapy [address], [place] [address] office: [phone] fax: [phone]       Physical Therapy: TREATMENT/PROGRESS NOTE   Patient: Kateryna Albert (48 y.o. female)   Examination Date: 2024   :  1975 MRN: [id number]   Visit #: 19   Insurance Allowable Auth Needed   30 []Yes    [x]No    Insurance: Payor: BCBS / Plan: BCBS - [place] PPO / Product Type: *No Product type* /   Insurance ID: [id number] - ([facility])  Secondary Insurance (if applicable):    Treatment Diagnosis:     ICD-10-CM    1. Neck pain  M54.2          Medical Diagnosis:  Neck pain [M54.2]   Referring Physician: Zuhair Rawls MD  PCP: Zuhair Rawls MD     Plan of care signed (Y/N):     Date of Patient follow up with Physician:      Progress Report/POC: NO  POC update due: (10 visits /OR AUTH LIMITS, whichever is less)  2024 NPV                                            Precautions/ Contra-indications:           Latex allergy:  YES  Pacemaker:    NO  Contraindications for Manipulation: None  Date of Surgery: N/A  Other:    Red Flags:  None    C-SSRS Triggered by Intake questionnaire:   Patient answered 'NO' to both behavioral questions on intake.  No further screening warranted    Preferred Language for Healthcare:   [x] English       [] other:    SUBJECTIVE EXAMINATION     Patient stated complaint: Pt reports that she has had a lot of stress between her daughter starting college and her mother in law having to go to the hospital.  Pt reports that she has felt very tight and has had more \"stabbing pain\" and headaches lately as a result of the increased stress. Pt also reports her R hand has been bothering her more as well.      Test used Initial score  2024   Pain Summary VAS 5/10 6-710   Functional questionnaire Neck Disability Index 40% disability 34% disability   Other:                OBJECTIVE EXAMINATION     24:  (-) vertebral  Index to assist with reaching prior level of function with activities such as reaching overhead and driving.  [] Progressing: [] Met: [x] Not Met: [] Adjusted  2. Patient will demonstrate increased AROM of cervical spine to WNL without pain to allow for proper joint functioning to enable patient to driving, reaching/dressing ADL's.   [x] Progressing: [] Met: [] Not Met: [] Adjusted  3. Patient will demonstrate increased Strength of UE to at least 4+/5 throughout without pain to allow for proper functional mobility to enable patient to return to perform ADLs.   [x] Progressing: [] Met: [] Not Met: [] Adjusted  4. Patient will return to driving for 20 minutes without increased symptoms or restriction.   [x] Progressing: [] Met: [] Not Met: [] Adjusted  5. Patient will be able to sleep for 1 night without disturbances due to increased symptoms or restriction.     [x] Progressing: [] Met: [] Not Met: [] Adjusted     Overall Progression Towards Functional goals/ Treatment Progress Update:  [x] Patient is progressing as expected towards functional goals listed.    [] Progression is slowed due to complexities/Impairments listed.  [] Progression has been slowed due to co-morbidities.  [] Plan just implemented, too soon (<30days) to assess goals progression   [] Goals require adjustment due to lack of progress  [] Patient is not progressing as expected and requires additional follow up with physician  [] Other:     TREATMENT PLAN     Frequency/Duration: 2x/week for  6-8  weeks for the following treatment interventions:    Interventions:  Therapeutic Exercise (46119) including: strength training, ROM, and functional mobility  Therapeutic Activities (81930) including: functional mobility training and education.  Neuromuscular Re-education (40021) activation and proprioception, including postural re-education.    Manual Therapy (36624) as indicated to include: Passive Range of Motion, Gr I-IV mobilizations, Soft Tissue

## 2024-09-03 ENCOUNTER — HOSPITAL ENCOUNTER (OUTPATIENT)
Dept: PHYSICAL THERAPY | Age: 49
Setting detail: THERAPIES SERIES
Discharge: HOME | End: 2024-09-03
Payer: COMMERCIAL

## 2024-09-03 PROCEDURE — 97140 MANUAL THERAPY 1/> REGIONS: CPT

## 2024-09-03 PROCEDURE — 97110 THERAPEUTIC EXERCISES: CPT

## 2024-09-03 PROCEDURE — 97112 NEUROMUSCULAR REEDUCATION: CPT

## 2024-09-03 NOTE — PLAN OF CARE
[facility] - Outpatient Rehabilitation and Therapy [address], [place] [address] office: [phone] fax: [phone]     Physical Therapy Re-Certification Plan of Care    Dear Zuhair Rawls MD  ,    We had the pleasure of treating the following patient for physical therapy services at [facility]. A summary of our findings can be found in the updated assessment below.  This includes our plan of care.  If you have any questions or concerns regarding these findings, please do not hesitate to contact me at the office phone number checked above.  Thank you for the referral.     Physician Signature:________________________________Date:__________________  By signing above (or electronic signature), therapist's plan is approved by physician      Functional Outcome: NDI 30% disability  Kateryna Albert 1975 continues to present with functional deficits in ROM, joint mobility, strength symmetry, endurance of strength, cardiovascular endurance, and eccentric control  limiting ability with transitions between positions, reaching overhead, carrying items, lifting items, pushing or pulling activity, ADLs, light home activity, and heavy home activity .  During therapy this date, patient required verbal cueing, tactile cueing, muscle facilitation, modification of technique, progression of exercises and program, and manual interventions for exercise progression, improving proper muscle recruitment and activation/motor control patterns, modulating pain, and improving soft tissue extensibility. Patient will continue to benefit from ongoing evaluation and advanced clinical decision from a Physical Therapist to improve pain control, ROM, muscle strength, neuromuscular control, functional mobility, ADL status, and high level IADLs to safely return to PLOF and ADLs/IADLs without symptoms or restrictions.    Overall Response to Treatment:  Patient is responding well

## 2024-09-10 ENCOUNTER — HOSPITAL ENCOUNTER (OUTPATIENT)
Dept: PHYSICAL THERAPY | Age: 49
Setting detail: THERAPIES SERIES
Discharge: HOME | End: 2024-09-10
Payer: COMMERCIAL

## 2024-09-10 ENCOUNTER — OFFICE VISIT (OUTPATIENT)
Age: 49
End: 2024-09-10
Payer: COMMERCIAL

## 2024-09-10 DIAGNOSIS — F41.8 DEPRESSION WITH ANXIETY: Primary | ICD-10-CM

## 2024-09-10 PROCEDURE — 97110 THERAPEUTIC EXERCISES: CPT

## 2024-09-10 PROCEDURE — 97140 MANUAL THERAPY 1/> REGIONS: CPT

## 2024-09-10 PROCEDURE — 90834 PSYTX W PT 45 MINUTES: CPT | Performed by: PSYCHOLOGIST

## 2024-09-10 PROCEDURE — 97112 NEUROMUSCULAR REEDUCATION: CPT

## 2024-09-16 RX ORDER — MONTELUKAST SODIUM 10 MG/1
10 TABLET ORAL NIGHTLY
Qty: 90 TABLET | Refills: 0 | Status: SHIPPED | OUTPATIENT
Start: 2024-09-16

## 2024-09-19 DIAGNOSIS — D75.839 THROMBOCYTOSIS: ICD-10-CM

## 2024-09-19 DIAGNOSIS — Z11.59 ENCOUNTER FOR HCV SCREENING TEST FOR LOW RISK PATIENT: ICD-10-CM

## 2024-09-19 DIAGNOSIS — E87.1 HYPONATREMIA: ICD-10-CM

## 2024-09-19 DIAGNOSIS — E78.00 PURE HYPERCHOLESTEROLEMIA: ICD-10-CM

## 2024-09-19 LAB
ANION GAP SERPL CALCULATED.3IONS-SCNC: 15 MMOL/L (ref 3–16)
BASOPHILS # BLD: 0.1 K/UL (ref 0–0.2)
BASOPHILS NFR BLD: 0.7 %
BUN SERPL-MCNC: 11 MG/DL (ref 7–20)
CALCIUM SERPL-MCNC: 9.3 MG/DL (ref 8.3–10.6)
CHLORIDE SERPL-SCNC: 103 MMOL/L (ref 99–110)
CHOLEST SERPL-MCNC: 173 MG/DL (ref 0–199)
CO2 SERPL-SCNC: 23 MMOL/L (ref 21–32)
CREAT SERPL-MCNC: 0.8 MG/DL (ref 0.6–1.1)
DEPRECATED RDW RBC AUTO: 15.9 % (ref 12.4–15.4)
EOSINOPHIL # BLD: 0.1 K/UL (ref 0–0.6)
EOSINOPHIL NFR BLD: 1.3 %
GFR SERPLBLD CREATININE-BSD FMLA CKD-EPI: 90 ML/MIN/{1.73_M2}
GLUCOSE SERPL-MCNC: 109 MG/DL (ref 70–99)
HCT VFR BLD AUTO: 37.3 % (ref 36–48)
HCV AB SERPL QL IA: NORMAL
HDLC SERPL-MCNC: 37 MG/DL (ref 40–60)
HGB BLD-MCNC: 12.5 G/DL (ref 12–16)
LDLC SERPL CALC-MCNC: 115 MG/DL
LYMPHOCYTES # BLD: 2.7 K/UL (ref 1–5.1)
LYMPHOCYTES NFR BLD: 23 %
MCH RBC QN AUTO: 27.6 PG (ref 26–34)
MCHC RBC AUTO-ENTMCNC: 33.4 G/DL (ref 31–36)
MCV RBC AUTO: 82.6 FL (ref 80–100)
MONOCYTES # BLD: 0.7 K/UL (ref 0–1.3)
MONOCYTES NFR BLD: 5.6 %
NEUTROPHILS # BLD: 8.2 K/UL (ref 1.7–7.7)
NEUTROPHILS NFR BLD: 69.4 %
PLATELET # BLD AUTO: ABNORMAL K/UL (ref 135–450)
PLATELET BLD QL SMEAR: ABNORMAL
PMV BLD AUTO: ABNORMAL FL (ref 5–10.5)
POTASSIUM SERPL-SCNC: 3.8 MMOL/L (ref 3.5–5.1)
RBC # BLD AUTO: 4.52 M/UL (ref 4–5.2)
SLIDE REVIEW: ABNORMAL
SODIUM SERPL-SCNC: 141 MMOL/L (ref 136–145)
TRIGL SERPL-MCNC: 104 MG/DL (ref 0–150)
VLDLC SERPL CALC-MCNC: 21 MG/DL
WBC # BLD AUTO: 11.8 K/UL (ref 4–11)

## 2024-09-23 ENCOUNTER — OFFICE VISIT (OUTPATIENT)
Dept: INTERNAL MEDICINE CLINIC | Age: 49
End: 2024-09-23
Payer: COMMERCIAL

## 2024-09-23 VITALS
OXYGEN SATURATION: 98 % | WEIGHT: 289.2 LBS | HEIGHT: 67 IN | DIASTOLIC BLOOD PRESSURE: 78 MMHG | BODY MASS INDEX: 45.39 KG/M2 | SYSTOLIC BLOOD PRESSURE: 124 MMHG | TEMPERATURE: 97.5 F | HEART RATE: 100 BPM

## 2024-09-23 DIAGNOSIS — D75.839 THROMBOCYTOSIS: ICD-10-CM

## 2024-09-23 DIAGNOSIS — I10 PRIMARY HYPERTENSION: ICD-10-CM

## 2024-09-23 DIAGNOSIS — J45.20 MILD INTERMITTENT ASTHMA WITHOUT COMPLICATION: Primary | ICD-10-CM

## 2024-09-23 DIAGNOSIS — F32.A ANXIETY AND DEPRESSION: ICD-10-CM

## 2024-09-23 DIAGNOSIS — F41.9 ANXIETY AND DEPRESSION: ICD-10-CM

## 2024-09-23 PROBLEM — U07.1 COVID: Status: RESOLVED | Noted: 2024-08-05 | Resolved: 2024-09-23

## 2024-09-23 PROBLEM — R07.9 CHEST PAIN: Status: RESOLVED | Noted: 2023-09-29 | Resolved: 2024-09-23

## 2024-09-23 PROCEDURE — G2211 COMPLEX E/M VISIT ADD ON: HCPCS | Performed by: INTERNAL MEDICINE

## 2024-09-23 PROCEDURE — 99214 OFFICE O/P EST MOD 30 MIN: CPT | Performed by: INTERNAL MEDICINE

## 2024-09-23 PROCEDURE — 3078F DIAST BP <80 MM HG: CPT | Performed by: INTERNAL MEDICINE

## 2024-09-23 PROCEDURE — 3074F SYST BP LT 130 MM HG: CPT | Performed by: INTERNAL MEDICINE

## 2024-09-23 RX ORDER — AZELASTINE 1 MG/ML
1 SPRAY, METERED NASAL 2 TIMES DAILY
Qty: 30 ML | Refills: 1 | Status: SHIPPED | OUTPATIENT
Start: 2024-09-23

## 2024-09-23 ASSESSMENT — ENCOUNTER SYMPTOMS: SHORTNESS OF BREATH: 0

## 2024-09-24 ENCOUNTER — TELEPHONE (OUTPATIENT)
Dept: ENT CLINIC | Age: 49
End: 2024-09-24

## 2024-09-24 ENCOUNTER — OFFICE VISIT (OUTPATIENT)
Dept: ENT CLINIC | Age: 49
End: 2024-09-24
Payer: COMMERCIAL

## 2024-09-24 ENCOUNTER — OFFICE VISIT (OUTPATIENT)
Dept: FAMILY MEDICINE CLINIC | Age: 49
End: 2024-09-24
Payer: COMMERCIAL

## 2024-09-24 VITALS
TEMPERATURE: 97 F | BODY MASS INDEX: 45.2 KG/M2 | HEART RATE: 67 BPM | HEIGHT: 67 IN | RESPIRATION RATE: 16 BRPM | SYSTOLIC BLOOD PRESSURE: 132 MMHG | DIASTOLIC BLOOD PRESSURE: 87 MMHG | WEIGHT: 288 LBS

## 2024-09-24 VITALS
OXYGEN SATURATION: 98 % | HEART RATE: 92 BPM | WEIGHT: 289.4 LBS | RESPIRATION RATE: 12 BRPM | DIASTOLIC BLOOD PRESSURE: 76 MMHG | TEMPERATURE: 97.3 F | BODY MASS INDEX: 45.33 KG/M2 | SYSTOLIC BLOOD PRESSURE: 110 MMHG

## 2024-09-24 DIAGNOSIS — G43.809 VESTIBULAR MIGRAINE: ICD-10-CM

## 2024-09-24 DIAGNOSIS — D75.839 THROMBOCYTOSIS: ICD-10-CM

## 2024-09-24 DIAGNOSIS — B02.9 HERPES ZOSTER WITHOUT COMPLICATION: Primary | ICD-10-CM

## 2024-09-24 DIAGNOSIS — J30.1 SEASONAL ALLERGIC RHINITIS DUE TO POLLEN: ICD-10-CM

## 2024-09-24 DIAGNOSIS — R42 DIZZINESS: Primary | ICD-10-CM

## 2024-09-24 LAB
BASOPHILS # BLD: 0.1 K/UL (ref 0–0.2)
BASOPHILS NFR BLD: 0.8 %
DEPRECATED RDW RBC AUTO: 15.9 % (ref 12.4–15.4)
EOSINOPHIL # BLD: 0.2 K/UL (ref 0–0.6)
EOSINOPHIL NFR BLD: 2 %
HCT VFR BLD AUTO: 37.4 % (ref 36–48)
HGB BLD-MCNC: 12.3 G/DL (ref 12–16)
LYMPHOCYTES # BLD: 2.6 K/UL (ref 1–5.1)
LYMPHOCYTES NFR BLD: 29.1 %
MCH RBC QN AUTO: 27.3 PG (ref 26–34)
MCHC RBC AUTO-ENTMCNC: 32.9 G/DL (ref 31–36)
MCV RBC AUTO: 82.8 FL (ref 80–100)
MONOCYTES # BLD: 0.5 K/UL (ref 0–1.3)
MONOCYTES NFR BLD: 5.5 %
NEUTROPHILS # BLD: 5.7 K/UL (ref 1.7–7.7)
NEUTROPHILS NFR BLD: 62.6 %
PLATELET # BLD AUTO: 491 K/UL (ref 135–450)
PMV BLD AUTO: 7 FL (ref 5–10.5)
RBC # BLD AUTO: 4.51 M/UL (ref 4–5.2)
WBC # BLD AUTO: 9.1 K/UL (ref 4–11)

## 2024-09-24 PROCEDURE — 3078F DIAST BP <80 MM HG: CPT | Performed by: NURSE PRACTITIONER

## 2024-09-24 PROCEDURE — 99213 OFFICE O/P EST LOW 20 MIN: CPT | Performed by: NURSE PRACTITIONER

## 2024-09-24 PROCEDURE — 3079F DIAST BP 80-89 MM HG: CPT | Performed by: OTOLARYNGOLOGY

## 2024-09-24 PROCEDURE — 3075F SYST BP GE 130 - 139MM HG: CPT | Performed by: OTOLARYNGOLOGY

## 2024-09-24 PROCEDURE — 99213 OFFICE O/P EST LOW 20 MIN: CPT | Performed by: OTOLARYNGOLOGY

## 2024-09-24 PROCEDURE — 3074F SYST BP LT 130 MM HG: CPT | Performed by: NURSE PRACTITIONER

## 2024-09-24 RX ORDER — VALACYCLOVIR HYDROCHLORIDE 1 G/1
1000 TABLET, FILM COATED ORAL 3 TIMES DAILY
Qty: 21 TABLET | Refills: 0 | Status: SHIPPED | OUTPATIENT
Start: 2024-09-24 | End: 2024-10-01

## 2024-09-24 RX ORDER — ASPIRIN 81 MG/1
81 TABLET, CHEWABLE ORAL DAILY
COMMUNITY

## 2024-09-24 RX ORDER — PREDNISONE 20 MG/1
20 TABLET ORAL DAILY
Qty: 10 TABLET | Refills: 0 | Status: SHIPPED | OUTPATIENT
Start: 2024-09-24 | End: 2024-10-04

## 2024-09-24 ASSESSMENT — ENCOUNTER SYMPTOMS
COLOR CHANGE: 0
SINUS PRESSURE: 0
RHINORRHEA: 0
SINUS PRESSURE: 1
EYE REDNESS: 0
EYE ITCHING: 0
NAUSEA: 0
SORE THROAT: 0
COUGH: 0
PHOTOPHOBIA: 0
RHINORRHEA: 1
BLOOD IN STOOL: 0
NAUSEA: 0
CONSTIPATION: 0
STRIDOR: 0
VOMITING: 0
CHEST TIGHTNESS: 0
EYE PAIN: 0
COUGH: 0
TROUBLE SWALLOWING: 0
SHORTNESS OF BREATH: 0
EYE REDNESS: 0
COLOR CHANGE: 0
CHOKING: 0
SINUS PAIN: 0
WHEEZING: 0
SHORTNESS OF BREATH: 0
ABDOMINAL PAIN: 0
EYE ITCHING: 0
FACIAL SWELLING: 0
SORE THROAT: 0
BACK PAIN: 0
DIARRHEA: 0
DIARRHEA: 0
VOICE CHANGE: 0

## 2024-09-26 RX ORDER — RIZATRIPTAN BENZOATE 10 MG/1
10 TABLET ORAL
Qty: 27 TABLET | Refills: 1 | Status: SHIPPED | OUTPATIENT
Start: 2024-09-26 | End: 2024-09-26

## 2024-10-03 PROBLEM — B02.9 HERPES ZOSTER WITHOUT COMPLICATION: Chronic | Status: ACTIVE | Noted: 2023-12-06

## 2024-10-08 PROBLEM — N32.81 OVERACTIVE BLADDER: Status: ACTIVE | Noted: 2024-10-08

## 2024-10-08 PROBLEM — N32.81 OVERACTIVE BLADDER: Chronic | Status: ACTIVE | Noted: 2024-10-08

## 2024-10-10 RX ORDER — MONTELUKAST SODIUM 10 MG/1
10 TABLET ORAL NIGHTLY
Qty: 90 TABLET | Refills: 1 | Status: SHIPPED | OUTPATIENT
Start: 2024-10-10

## 2024-10-21 RX ORDER — SPIRONOLACTONE AND HYDROCHLOROTHIAZIDE 25; 25 MG/1; MG/1
1 TABLET ORAL DAILY
Qty: 90 TABLET | Refills: 1 | Status: SHIPPED | OUTPATIENT
Start: 2024-10-21

## 2024-10-22 ENCOUNTER — OFFICE VISIT (OUTPATIENT)
Age: 49
End: 2024-10-22
Payer: COMMERCIAL

## 2024-10-22 DIAGNOSIS — F41.8 DEPRESSION WITH ANXIETY: Primary | ICD-10-CM

## 2024-10-22 PROCEDURE — 90832 PSYTX W PT 30 MINUTES: CPT | Performed by: PSYCHOLOGIST

## 2024-10-22 ASSESSMENT — PROMIS GLOBAL HEALTH SCALE
IN THE PAST 7 DAYS, HOW WOULD YOU RATE YOUR FATIGUE ON AVERAGE [ON A SCALE FROM 1 (NONE) TO 5 (VERY SEVERE)]?: SEVERE
SUM OF RESPONSES TO QUESTIONS 3, 6, 7, & 8: 13
TO WHAT EXTENT ARE YOU ABLE TO CARRY OUT YOUR EVERYDAY PHYSICAL ACTIVITIES SUCH AS WALKING, CLIMBING STAIRS, CARRYING GROCERIES, OR MOVING A CHAIR [ON A SCALE OF 1 (NOT AT ALL) TO 5 (COMPLETELY)]?: MOSTLY
IN THE PAST 7 DAYS, HOW WOULD YOU RATE YOUR PAIN ON AVERAGE [ON A SCALE FROM 0 (NO PAIN) TO 10 (WORST IMAGINABLE PAIN)]?: 4
IN GENERAL, HOW WOULD YOU RATE YOUR PHYSICAL HEALTH [ON A SCALE OF 1 (POOR) TO 5 (EXCELLENT)]?: GOOD
IN GENERAL, WOULD YOU SAY YOUR QUALITY OF LIFE IS...[ON A SCALE OF 1 (POOR) TO 5 (EXCELLENT)]: GOOD
IN GENERAL, WOULD YOU SAY YOUR HEALTH IS...[ON A SCALE OF 1 (POOR) TO 5 (EXCELLENT)]: GOOD
IN GENERAL, HOW WOULD YOU RATE YOUR MENTAL HEALTH, INCLUDING YOUR MOOD AND YOUR ABILITY TO THINK [ON A SCALE OF 1 (POOR) TO 5 (EXCELLENT)]?: GOOD
IN THE PAST 7 DAYS, HOW OFTEN HAVE YOU BEEN BOTHERED BY EMOTIONAL PROBLEMS, SUCH AS FEELING ANXIOUS, DEPRESSED, OR IRRITABLE [ON A SCALE FROM 1 (NEVER) TO 5 (ALWAYS)]?: ALWAYS
SUM OF RESPONSES TO QUESTIONS 2, 4, 5, & 10: 9
IN GENERAL, PLEASE RATE HOW WELL YOU CARRY OUT YOUR USUAL SOCIAL ACTIVITIES (INCLUDES ACTIVITIES AT HOME, AT WORK, AND IN YOUR COMMUNITY, AND RESPONSIBILITIES AS A PARENT, CHILD, SPOUSE, EMPLOYEE, FRIEND, ETC) [ON A SCALE OF 1 (POOR) TO 5 (EXCELLENT)]?: GOOD
IN GENERAL, HOW WOULD YOU RATE YOUR SATISFACTION WITH YOUR SOCIAL ACTIVITIES AND RELATIONSHIPS [ON A SCALE OF 1 (POOR) TO 5 (EXCELLENT)]?: FAIR
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
## 2024-10-24 ENCOUNTER — TELEPHONE (OUTPATIENT)
Dept: INTERNAL MEDICINE CLINIC | Age: 49
End: 2024-10-24

## 2024-10-24 NOTE — TELEPHONE ENCOUNTER
Script in the chart for Rizatriptan Benzoate 10MG tablets has ended; will need a new script to do the PA.    Please respond to the pool ( P [facility] MEDICATION PRE-AUTH).      Thank you!

## 2024-10-29 ENCOUNTER — TELEPHONE (OUTPATIENT)
Dept: ENT CLINIC | Age: 49
End: 2024-10-29

## 2024-10-29 ENCOUNTER — NURSE ONLY (OUTPATIENT)
Dept: ENT CLINIC | Age: 49
End: 2024-10-29
Payer: COMMERCIAL

## 2024-10-29 VITALS — SYSTOLIC BLOOD PRESSURE: 128 MMHG | HEART RATE: 100 BPM | TEMPERATURE: 97.8 F | DIASTOLIC BLOOD PRESSURE: 64 MMHG

## 2024-10-29 DIAGNOSIS — J30.1 SEASONAL ALLERGIC RHINITIS DUE TO POLLEN: Primary | ICD-10-CM

## 2024-10-29 PROCEDURE — 95004 PERQ TESTS W/ALRGNC XTRCS: CPT | Performed by: OTOLARYNGOLOGY

## 2024-10-29 PROCEDURE — 96372 THER/PROPH/DIAG INJ SC/IM: CPT | Performed by: OTOLARYNGOLOGY

## 2024-10-29 PROCEDURE — 95024 IQ TESTS W/ALLERGENIC XTRCS: CPT | Performed by: OTOLARYNGOLOGY

## 2024-10-29 RX ORDER — DEXAMETHASONE SODIUM PHOSPHATE 10 MG/ML
10 INJECTION, SOLUTION INTRAMUSCULAR; INTRAVENOUS ONCE
Status: COMPLETED | OUTPATIENT
Start: 2024-10-29 | End: 2024-10-29

## 2024-10-29 RX ORDER — DEXAMETHASONE SODIUM PHOSPHATE 10 MG/ML
10 INJECTION, SOLUTION INTRAMUSCULAR; INTRAVENOUS ONCE
Status: DISCONTINUED | OUTPATIENT
Start: 2024-10-29 | End: 2024-10-29

## 2024-10-29 RX ORDER — DIPHENHYDRAMINE HCL 25 MG
50 CAPSULE ORAL ONCE
Status: COMPLETED | OUTPATIENT
Start: 2024-10-29 | End: 2024-10-29

## 2024-10-29 RX ADMIN — DEXAMETHASONE SODIUM PHOSPHATE 10 MG: 10 INJECTION, SOLUTION INTRAMUSCULAR; INTRAVENOUS at 14:26

## 2024-10-29 RX ADMIN — Medication 50 MG: at 14:20

## 2024-10-29 NOTE — TELEPHONE ENCOUNTER
Spoke to patient's  over the phone and he states that she is sleeping soundly with no issues at present.

## 2024-10-29 NOTE — PROGRESS NOTES
much easier.  1437- /77, HR 94  Pt states throat is only \"slightly thick feeling now\" and chest is no longer heavy.   1445-/84, , pulse ox 95%  1501-patient states she is tired, but symptoms are mostly gone and feels well enough to go home. No wheezes heard on auscultation. Dr. Sanches at the bedside to evaluate again. Patient instructed to restart her Clarinex BID as she was taking before and to take Benadryl tonight, in the morning after she gets home from taking her daughter to school and then again tomorrow night. Patient has her own epipens with her. I reinstructed her and her  how to use epipen and to call 911 for any trouble breathing or swallowing. Both verbalized understanding. Patient left office with  driving. Patient has epipen and albuterol inhaler on her person.     Patient will f/u with Dr. Sanches on allergy testing on 11/5/24.

## 2024-10-30 ENCOUNTER — TELEPHONE (OUTPATIENT)
Dept: ENT CLINIC | Age: 49
End: 2024-10-30

## 2024-10-30 NOTE — TELEPHONE ENCOUNTER
Called and spoke to patient over the phone. She states that she does not have any further oral symptoms from allergy testing reaction yesterday. She is talking easily over the phone. Denies any hives. Does have headache. Re-instructed on symptoms that would require emergency treatment. Patient has had some rib pain that started a few weeks ago and I instructed her to call her PCP to f/u on that. Pt. Verb understanding.

## 2024-10-31 ENCOUNTER — OFFICE VISIT (OUTPATIENT)
Dept: PSYCHIATRY | Age: 49
End: 2024-10-31
Payer: COMMERCIAL

## 2024-10-31 VITALS
SYSTOLIC BLOOD PRESSURE: 112 MMHG | HEIGHT: 67 IN | DIASTOLIC BLOOD PRESSURE: 78 MMHG | BODY MASS INDEX: 45.83 KG/M2 | OXYGEN SATURATION: 97 % | HEART RATE: 92 BPM | WEIGHT: 292 LBS

## 2024-10-31 DIAGNOSIS — F41.1 GAD (GENERALIZED ANXIETY DISORDER): ICD-10-CM

## 2024-10-31 DIAGNOSIS — F43.10 PTSD (POST-TRAUMATIC STRESS DISORDER): ICD-10-CM

## 2024-10-31 DIAGNOSIS — G47.33 OBSTRUCTIVE SLEEP APNEA: Primary | ICD-10-CM

## 2024-10-31 DIAGNOSIS — F33.1 MODERATE EPISODE OF RECURRENT MAJOR DEPRESSIVE DISORDER (HCC): ICD-10-CM

## 2024-10-31 PROCEDURE — 3074F SYST BP LT 130 MM HG: CPT | Performed by: STUDENT IN AN ORGANIZED HEALTH CARE EDUCATION/TRAINING PROGRAM

## 2024-10-31 PROCEDURE — 3078F DIAST BP <80 MM HG: CPT | Performed by: STUDENT IN AN ORGANIZED HEALTH CARE EDUCATION/TRAINING PROGRAM

## 2024-10-31 ASSESSMENT — PATIENT HEALTH QUESTIONNAIRE - PHQ9
1. LITTLE INTEREST OR PLEASURE IN DOING THINGS: SEVERAL DAYS
2. FEELING DOWN, DEPRESSED OR HOPELESS: NEARLY EVERY DAY
SUM OF ALL RESPONSES TO PHQ QUESTIONS 1-9: 15
9. THOUGHTS THAT YOU WOULD BE BETTER OFF DEAD, OR OF HURTING YOURSELF: MORE THAN HALF THE DAYS
SUM OF ALL RESPONSES TO PHQ QUESTIONS 1-9: 15
SUM OF ALL RESPONSES TO PHQ9 QUESTIONS 1 & 2: 4
5. POOR APPETITE OR OVEREATING: SEVERAL DAYS
3. TROUBLE FALLING OR STAYING ASLEEP: MORE THAN HALF THE DAYS
SUM OF ALL RESPONSES TO PHQ QUESTIONS 1-9: 13
8. MOVING OR SPEAKING SO SLOWLY THAT OTHER PEOPLE COULD HAVE NOTICED. OR THE OPPOSITE, BEING SO FIGETY OR RESTLESS THAT YOU HAVE BEEN MOVING AROUND A LOT MORE THAN USUAL: NOT AT ALL
10. IF YOU CHECKED OFF ANY PROBLEMS, HOW DIFFICULT HAVE THESE PROBLEMS MADE IT FOR YOU TO DO YOUR WORK, TAKE CARE OF THINGS AT HOME, OR GET ALONG WITH OTHER PEOPLE: VERY DIFFICULT
SUM OF ALL RESPONSES TO PHQ QUESTIONS 1-9: 15
7. TROUBLE CONCENTRATING ON THINGS, SUCH AS READING THE NEWSPAPER OR WATCHING TELEVISION: SEVERAL DAYS
4. FEELING TIRED OR HAVING LITTLE ENERGY: MORE THAN HALF THE DAYS
6. FEELING BAD ABOUT YOURSELF - OR THAT YOU ARE A FAILURE OR HAVE LET YOURSELF OR YOUR FAMILY DOWN: NEARLY EVERY DAY

## 2024-10-31 ASSESSMENT — ANXIETY QUESTIONNAIRES
3. WORRYING TOO MUCH ABOUT DIFFERENT THINGS: NEARLY EVERY DAY
1. FEELING NERVOUS, ANXIOUS, OR ON EDGE: NEARLY EVERY DAY
GAD7 TOTAL SCORE: 15
6. BECOMING EASILY ANNOYED OR IRRITABLE: SEVERAL DAYS
2. NOT BEING ABLE TO STOP OR CONTROL WORRYING: NEARLY EVERY DAY
4. TROUBLE RELAXING: MORE THAN HALF THE DAYS
IF YOU CHECKED OFF ANY PROBLEMS ON THIS QUESTIONNAIRE, HOW DIFFICULT HAVE THESE PROBLEMS MADE IT FOR YOU TO DO YOUR WORK, TAKE CARE OF THINGS AT HOME, OR GET ALONG WITH OTHER PEOPLE: VERY DIFFICULT
7. FEELING AFRAID AS IF SOMETHING AWFUL MIGHT HAPPEN: MORE THAN HALF THE DAYS
5. BEING SO RESTLESS THAT IT IS HARD TO SIT STILL: SEVERAL DAYS

## 2024-10-31 NOTE — PATIENT INSTRUCTIONS
Viibryd:  Week 1: 20mg  Week 2: 10mg  Week 3: discontinue    Zoloft:  Week 1 and 2: 25mg  Week 3: 50mg    Trauma therapy:  CPT - Cognitive Processing Therapy  EMDR      Good sleep habits (sometimes referred to as “sleep hygiene”) can help you get a good night’s sleep. Some habits that can improve your sleep health:    Be consistent. Go to bed at the same time each night and get up at the same time each morning, including on the weekends. No napping during the daytime.    Make sure your bedroom is quiet, dark, relaxing, and at a comfortable temperature.    Remove electronic devices, such as TVs, computers, and smart phones from the bedroom.    Avoid large meals, caffeine, and alcohol before bedtime. No caffeine after 2pm.    Get some exercise. Being physically active during the day can help you fall asleep more easily at night. Avoid exercise before bedtime.    Complete a relaxing bedtime routine and avoid overstimulating or upsetting activities or information before bed (ex: news, \"true crime\", etc - anything that worsens anxiety)    7. If you can't sleep, leave the bedroom and read in a different room.    8. \"ABC\" category exercise: pick a category (like fruits and veggies) and think of one item alphabetically: A for apple, B for banana, C for carrot... etc.

## 2024-10-31 NOTE — PROGRESS NOTES
results found for this or any previous visit.       MRI Brain: No results found for this or any previous visit.      PHQ-9 and MARCI-7      10/31/2024    10:03 AM 1/12/2024    11:32 AM   PHQ-9 Questionaire   Little interest or pleasure in doing things 1 1   Feeling down, depressed, or hopeless 3 0   Trouble falling or staying asleep, or sleeping too much 2 0   Feeling tired or having little energy 2 0   Poor appetite or overeating 1 0   Feeling bad about yourself - or that you are a failure or have let yourself or your family down 3 0   Trouble concentrating on things, such as reading the newspaper or watching television 1 0   Moving or speaking so slowly that other people could have noticed. Or the opposite - being so fidgety or restless that you have been moving around a lot more than usual 0 0   Thoughts that you would be better off dead, or of hurting yourself in some way 2 0   PHQ-9 Total Score 15 1   If you checked off any problems, how difficult have these problems made it for you to do your work, take care of things at home, or get along with other people? 2 0         10/31/2024    10:00 AM   MARCI-7 SCREENING   Feeling nervous, anxious, or on edge Nearly every day   Not being able to stop or control worrying Nearly every day   Worrying too much about different things Nearly every day   Trouble relaxing More than half the days   Being so restless that it is hard to sit still Several days   Becoming easily annoyed or irritable Several days   Feeling afraid as if something awful might happen More than half the days   MARCI-7 Total Score 15   How difficult have these problems made it for you to do your work, take care of things at home, or get along with other people? Very difficult        AIMS Scale

## 2024-11-01 DIAGNOSIS — I10 PRIMARY HYPERTENSION: Primary | ICD-10-CM

## 2024-11-04 RX ORDER — LOSARTAN POTASSIUM 100 MG/1
100 TABLET ORAL DAILY
Qty: 90 TABLET | Refills: 1 | Status: SHIPPED | OUTPATIENT
Start: 2024-11-04

## 2024-11-04 NOTE — TELEPHONE ENCOUNTER
Last appointment: 9/23/2024  Next appointment: 3/26/2025  Last refill: 6/5/24  Requested Prescriptions     Pending Prescriptions Disp Refills    losartan (COZAAR) 100 MG tablet 60 tablet 0     Sig: Take 1 tablet by mouth daily

## 2024-11-19 RX ORDER — PANTOPRAZOLE SODIUM 40 MG/1
40 TABLET, DELAYED RELEASE ORAL DAILY
Qty: 180 TABLET | Refills: 1 | Status: SHIPPED | OUTPATIENT
Start: 2024-11-19

## 2024-11-19 RX ORDER — PANTOPRAZOLE SODIUM 40 MG/1
40 TABLET, DELAYED RELEASE ORAL DAILY
Qty: 180 TABLET | Refills: 1 | OUTPATIENT
Start: 2024-11-19

## 2024-11-19 NOTE — TELEPHONE ENCOUNTER
Last appointment: 1/23/2024  Next appointment: Visit date not found  Last refill: 4/15/24  Unsure of dx code. Please review     Requested Prescriptions     Pending Prescriptions Disp Refills    pantoprazole (PROTONIX) 40 MG tablet 180 tablet 1

## 2024-12-02 ENCOUNTER — TELEMEDICINE (OUTPATIENT)
Dept: PSYCHIATRY | Age: 49
End: 2024-12-02

## 2024-12-02 DIAGNOSIS — F41.1 GAD (GENERALIZED ANXIETY DISORDER): ICD-10-CM

## 2024-12-02 DIAGNOSIS — F33.1 MODERATE EPISODE OF RECURRENT MAJOR DEPRESSIVE DISORDER (HCC): Primary | ICD-10-CM

## 2024-12-02 DIAGNOSIS — F43.10 PTSD (POST-TRAUMATIC STRESS DISORDER): ICD-10-CM

## 2024-12-02 PROCEDURE — 90833 PSYTX W PT W E/M 30 MIN: CPT | Performed by: STUDENT IN AN ORGANIZED HEALTH CARE EDUCATION/TRAINING PROGRAM

## 2024-12-02 PROCEDURE — 99214 OFFICE O/P EST MOD 30 MIN: CPT | Performed by: STUDENT IN AN ORGANIZED HEALTH CARE EDUCATION/TRAINING PROGRAM

## 2024-12-02 RX ORDER — SERTRALINE HYDROCHLORIDE 100 MG/1
100 TABLET, FILM COATED ORAL DAILY
Qty: 90 TABLET | Refills: 1 | Status: SHIPPED | OUTPATIENT
Start: 2024-12-02

## 2024-12-02 RX ORDER — LAMOTRIGINE 50 MG/1
50 TABLET, EXTENDED RELEASE ORAL DAILY
Qty: 90 TABLET | Refills: 1 | Status: SHIPPED | OUTPATIENT
Start: 2024-12-02

## 2024-12-02 ASSESSMENT — ANXIETY QUESTIONNAIRES
1. FEELING NERVOUS, ANXIOUS, OR ON EDGE: NEARLY EVERY DAY
2. NOT BEING ABLE TO STOP OR CONTROL WORRYING: NEARLY EVERY DAY
4. TROUBLE RELAXING: MORE THAN HALF THE DAYS
3. WORRYING TOO MUCH ABOUT DIFFERENT THINGS: NEARLY EVERY DAY
6. BECOMING EASILY ANNOYED OR IRRITABLE: NEARLY EVERY DAY
7. FEELING AFRAID AS IF SOMETHING AWFUL MIGHT HAPPEN: NEARLY EVERY DAY
IF YOU CHECKED OFF ANY PROBLEMS ON THIS QUESTIONNAIRE, HOW DIFFICULT HAVE THESE PROBLEMS MADE IT FOR YOU TO DO YOUR WORK, TAKE CARE OF THINGS AT HOME, OR GET ALONG WITH OTHER PEOPLE: VERY DIFFICULT
GAD7 TOTAL SCORE: 18
5. BEING SO RESTLESS THAT IT IS HARD TO SIT STILL: SEVERAL DAYS

## 2024-12-02 ASSESSMENT — PATIENT HEALTH QUESTIONNAIRE - PHQ9
SUM OF ALL RESPONSES TO PHQ QUESTIONS 1-9: 15
SUM OF ALL RESPONSES TO PHQ QUESTIONS 1-9: 14
1. LITTLE INTEREST OR PLEASURE IN DOING THINGS: SEVERAL DAYS
SUM OF ALL RESPONSES TO PHQ QUESTIONS 1-9: 15
6. FEELING BAD ABOUT YOURSELF - OR THAT YOU ARE A FAILURE OR HAVE LET YOURSELF OR YOUR FAMILY DOWN: NEARLY EVERY DAY
2. FEELING DOWN, DEPRESSED OR HOPELESS: NEARLY EVERY DAY
5. POOR APPETITE OR OVEREATING: SEVERAL DAYS
8. MOVING OR SPEAKING SO SLOWLY THAT OTHER PEOPLE COULD HAVE NOTICED. OR THE OPPOSITE, BEING SO FIGETY OR RESTLESS THAT YOU HAVE BEEN MOVING AROUND A LOT MORE THAN USUAL: NOT AT ALL
7. TROUBLE CONCENTRATING ON THINGS, SUCH AS READING THE NEWSPAPER OR WATCHING TELEVISION: NOT AT ALL
9. THOUGHTS THAT YOU WOULD BE BETTER OFF DEAD, OR OF HURTING YOURSELF: SEVERAL DAYS
4. FEELING TIRED OR HAVING LITTLE ENERGY: NEARLY EVERY DAY
10. IF YOU CHECKED OFF ANY PROBLEMS, HOW DIFFICULT HAVE THESE PROBLEMS MADE IT FOR YOU TO DO YOUR WORK, TAKE CARE OF THINGS AT HOME, OR GET ALONG WITH OTHER PEOPLE: VERY DIFFICULT
SUM OF ALL RESPONSES TO PHQ QUESTIONS 1-9: 15
SUM OF ALL RESPONSES TO PHQ9 QUESTIONS 1 & 2: 4
3. TROUBLE FALLING OR STAYING ASLEEP: NEARLY EVERY DAY

## 2024-12-02 NOTE — PROGRESS NOTES
reading the newspaper or watching television 0 1   Moving or speaking so slowly that other people could have noticed. Or the opposite - being so fidgety or restless that you have been moving around a lot more than usual 0 0   Thoughts that you would be better off dead, or of hurting yourself in some way 1 2   PHQ-9 Total Score 15 15   If you checked off any problems, how difficult have these problems made it for you to do your work, take care of things at home, or get along with other people? 2 2         12/2/2024     2:00 PM 10/31/2024    10:00 AM   MARCI-7 SCREENING   Feeling nervous, anxious, or on edge Nearly every day Nearly every day   Not being able to stop or control worrying Nearly every day Nearly every day   Worrying too much about different things Nearly every day Nearly every day   Trouble relaxing More than half the days More than half the days   Being so restless that it is hard to sit still Several days Several days   Becoming easily annoyed or irritable Nearly every day Several days   Feeling afraid as if something awful might happen Nearly every day More than half the days   MARCI-7 Total Score 18 15   How difficult have these problems made it for you to do your work, take care of things at home, or get along with other people? Very difficult Very difficult        AIMS Scale

## 2024-12-10 RX ORDER — MONTELUKAST SODIUM 10 MG/1
10 TABLET ORAL NIGHTLY
Qty: 90 TABLET | Refills: 1 | Status: SHIPPED | OUTPATIENT
Start: 2024-12-10

## 2024-12-31 ENCOUNTER — TELEPHONE (OUTPATIENT)
Dept: INTERNAL MEDICINE CLINIC | Age: 49
End: 2024-12-31

## 2024-12-31 NOTE — TELEPHONE ENCOUNTER
Pt states she tested Negative for COVID but would like something sent to pharmacy for relief    [phone]  Pls call and advise    [address] [facility] [id number] - [place] [id number] - P [phone] - F [phone]

## 2024-12-31 NOTE — TELEPHONE ENCOUNTER
Recommend conservative treatment with nasal saline rinses, Flonase nasal spray, OTC cough medications, throat lozenges, OTC decongestant, Tylenol for fever/pain

## 2024-12-31 NOTE — TELEPHONE ENCOUNTER
Called patient and she hasn't taken a covid test yet. Told her to take a covid test then call us back to schedule with the results.        Appointment Request From: Kateryna Albert      With Provider: Dr. Zuhair Rawls MD [[facility] [address]]      Preferred Date Range: 12/31/2024 - 12/31/2024      Preferred Times: Any Time      Reason for visit: Request an Appointment      Comments:   head congestion, head & body aches, sore throat, watery eyes, sneezing

## 2024-12-31 NOTE — TELEPHONE ENCOUNTER
I spoke with Kateryna and advised Dr Rawls's recommendation. She expressed verbal understanding and said that this has been going on since before Thanksgiving. Is there anything different she should do since this has been going on for so long?

## 2025-01-20 RX ORDER — SPIRONOLACTONE AND HYDROCHLOROTHIAZIDE 25; 25 MG/1; MG/1
1 TABLET ORAL DAILY
Qty: 90 TABLET | Refills: 1 | Status: SHIPPED | OUTPATIENT
Start: 2025-01-20

## 2025-02-05 LAB
ALBUMIN: 4.2 G/DL (ref 3.5–5.7)
ALP BLD-CCNC: 88 IU/L (ref 35–135)
ALT SERPL-CCNC: 30 IU/L (ref 10–60)
AMYLASE: 63 U/L (ref 28–100)
ANION GAP SERPL CALCULATED.3IONS-SCNC: 9 MMOL/L (ref 4–16)
AST SERPL-CCNC: 19 IU/L (ref 10–40)
BASOPHILS ABSOLUTE: 0 %
BASOPHILS ABSOLUTE: 0 THOU/MCL (ref 0–0.2)
BILIRUB SERPL-MCNC: 0.5 MG/DL (ref 0–1.2)
BUN BLDV-MCNC: 13 MG/DL (ref 8–26)
CALCIUM SERPL-MCNC: 9.7 MG/DL (ref 8.5–10.4)
CHLORIDE BLD-SCNC: 102 MMOL/L (ref 98–111)
CO2: 27 MMOL/L (ref 21–31)
CREAT SERPL-MCNC: 0.73 MG/DL (ref 0.6–1.2)
EGFR (CKD-EPI): 100 ML/MIN/1.73 M2
EOSINOPHILS ABSOLUTE: 0.1 THOU/MCL (ref 0.03–0.45)
EOSINOPHILS RELATIVE PERCENT: 1 %
GLUCOSE BLD-MCNC: 99 MG/DL (ref 70–99)
HCT VFR BLD CALC: 39.2 % (ref 36–46)
HEMOGLOBIN: 13.1 G/DL (ref 12–15.2)
LIPASE: 44 U/L (ref 11–82)
LYMPHOCYTES ABSOLUTE: 3.1 THOU/MCL (ref 1–4)
LYMPHOCYTES RELATIVE PERCENT: 27 %
MCH RBC QN AUTO: 28 PG (ref 27–33)
MCHC RBC AUTO-ENTMCNC: 33.4 G/DL (ref 32–36)
MCV RBC AUTO: 83.9 FL (ref 82–97)
MONOCYTES ABSOLUTE: 0.6 THOU/MCL (ref 0.2–0.9)
MONOCYTES RELATIVE PERCENT: 5 %
NEUTROPHILS ABSOLUTE: 7.7 THOU/MCL (ref 1.8–7.7)
PDW BLD-RTO: 15.8 % (ref 12.3–17)
PLATELET # BLD: 532 THOU/MCL (ref 140–375)
PMV BLD AUTO: 6.9 FL (ref 7.4–11.5)
POTASSIUM SERPL-SCNC: 3.8 MMOL/L (ref 3.6–5.1)
RBC # BLD: 4.67 MIL/MCL (ref 3.8–5.2)
SEG NEUTROPHILS: 67 %
SODIUM BLD-SCNC: 138 MMOL/L (ref 135–145)
TOTAL PROTEIN: 6.8 G/DL (ref 6–8)
WBC # BLD: 11.6 THOU/MCL (ref 3.6–10.5)

## 2025-02-17 ENCOUNTER — TELEMEDICINE (OUTPATIENT)
Dept: PSYCHIATRY | Age: 50
End: 2025-02-17
Payer: COMMERCIAL

## 2025-02-17 DIAGNOSIS — F33.1 MODERATE EPISODE OF RECURRENT MAJOR DEPRESSIVE DISORDER (HCC): Primary | ICD-10-CM

## 2025-02-17 DIAGNOSIS — F41.1 GAD (GENERALIZED ANXIETY DISORDER): ICD-10-CM

## 2025-02-17 DIAGNOSIS — F43.10 PTSD (POST-TRAUMATIC STRESS DISORDER): ICD-10-CM

## 2025-02-17 PROCEDURE — 90833 PSYTX W PT W E/M 30 MIN: CPT | Performed by: STUDENT IN AN ORGANIZED HEALTH CARE EDUCATION/TRAINING PROGRAM

## 2025-02-17 PROCEDURE — 99214 OFFICE O/P EST MOD 30 MIN: CPT | Performed by: STUDENT IN AN ORGANIZED HEALTH CARE EDUCATION/TRAINING PROGRAM

## 2025-02-17 RX ORDER — ESCITALOPRAM OXALATE 10 MG/1
10 TABLET ORAL DAILY
Qty: 30 TABLET | Refills: 2 | Status: SHIPPED | OUTPATIENT
Start: 2025-02-17

## 2025-02-17 ASSESSMENT — ANXIETY QUESTIONNAIRES
1. FEELING NERVOUS, ANXIOUS, OR ON EDGE: MORE THAN HALF THE DAYS
GAD7 TOTAL SCORE: 12
4. TROUBLE RELAXING: SEVERAL DAYS
5. BEING SO RESTLESS THAT IT IS HARD TO SIT STILL: SEVERAL DAYS
3. WORRYING TOO MUCH ABOUT DIFFERENT THINGS: NEARLY EVERY DAY
6. BECOMING EASILY ANNOYED OR IRRITABLE: MORE THAN HALF THE DAYS
2. NOT BEING ABLE TO STOP OR CONTROL WORRYING: NEARLY EVERY DAY
IF YOU CHECKED OFF ANY PROBLEMS ON THIS QUESTIONNAIRE, HOW DIFFICULT HAVE THESE PROBLEMS MADE IT FOR YOU TO DO YOUR WORK, TAKE CARE OF THINGS AT HOME, OR GET ALONG WITH OTHER PEOPLE: EXTREMELY DIFFICULT
7. FEELING AFRAID AS IF SOMETHING AWFUL MIGHT HAPPEN: NOT AT ALL

## 2025-02-17 ASSESSMENT — PATIENT HEALTH QUESTIONNAIRE - PHQ9
4. FEELING TIRED OR HAVING LITTLE ENERGY: NEARLY EVERY DAY
SUM OF ALL RESPONSES TO PHQ QUESTIONS 1-9: 20
SUM OF ALL RESPONSES TO PHQ QUESTIONS 1-9: 19
SUM OF ALL RESPONSES TO PHQ9 QUESTIONS 1 & 2: 6
10. IF YOU CHECKED OFF ANY PROBLEMS, HOW DIFFICULT HAVE THESE PROBLEMS MADE IT FOR YOU TO DO YOUR WORK, TAKE CARE OF THINGS AT HOME, OR GET ALONG WITH OTHER PEOPLE: EXTREMELY DIFFICULT
1. LITTLE INTEREST OR PLEASURE IN DOING THINGS: NEARLY EVERY DAY
SUM OF ALL RESPONSES TO PHQ QUESTIONS 1-9: 20
6. FEELING BAD ABOUT YOURSELF - OR THAT YOU ARE A FAILURE OR HAVE LET YOURSELF OR YOUR FAMILY DOWN: NEARLY EVERY DAY
SUM OF ALL RESPONSES TO PHQ QUESTIONS 1-9: 20
8. MOVING OR SPEAKING SO SLOWLY THAT OTHER PEOPLE COULD HAVE NOTICED. OR THE OPPOSITE, BEING SO FIGETY OR RESTLESS THAT YOU HAVE BEEN MOVING AROUND A LOT MORE THAN USUAL: SEVERAL DAYS
3. TROUBLE FALLING OR STAYING ASLEEP: NEARLY EVERY DAY
7. TROUBLE CONCENTRATING ON THINGS, SUCH AS READING THE NEWSPAPER OR WATCHING TELEVISION: SEVERAL DAYS
9. THOUGHTS THAT YOU WOULD BE BETTER OFF DEAD, OR OF HURTING YOURSELF: SEVERAL DAYS
5. POOR APPETITE OR OVEREATING: MORE THAN HALF THE DAYS
2. FEELING DOWN, DEPRESSED OR HOPELESS: NEARLY EVERY DAY

## 2025-02-17 NOTE — PROGRESS NOTES
days   Becoming easily annoyed or irritable More than half the days Nearly every day   Feeling afraid as if something awful might happen Not at all Nearly every day   MARCI-7 Total Score 12 18   How difficult have these problems made it for you to do your work, take care of things at home, or get along with other people? Extremely difficult Very difficult        AIMS Scale

## 2025-03-03 ENCOUNTER — PATIENT MESSAGE (OUTPATIENT)
Dept: INTERNAL MEDICINE CLINIC | Age: 50
End: 2025-03-03

## 2025-03-03 NOTE — TELEPHONE ENCOUNTER
Dr. Rawls can see pt today at 11:40 am.    Pt states she will see Dr. Sanches on Wednesday and then follow-up with Dr. Rawls. Pt will call back and we will schedule an appt.

## 2025-03-03 NOTE — TELEPHONE ENCOUNTER
Needs an actual visit to discuss symptoms and exam to be able to come up with diagnostic plan.  Glad to hear she is seeing ENT in 2 days, this would be valuable evaluation, if Dr. Sanches does not feel this is ENT related, should see me in the office to be able to take comprehensive history and exam

## 2025-03-05 ENCOUNTER — PROCEDURE VISIT (OUTPATIENT)
Dept: AUDIOLOGY | Age: 50
End: 2025-03-05
Payer: COMMERCIAL

## 2025-03-05 ENCOUNTER — OFFICE VISIT (OUTPATIENT)
Dept: ENT CLINIC | Age: 50
End: 2025-03-05
Payer: COMMERCIAL

## 2025-03-05 VITALS
BODY MASS INDEX: 48.32 KG/M2 | TEMPERATURE: 97.7 F | HEART RATE: 91 BPM | SYSTOLIC BLOOD PRESSURE: 115 MMHG | HEIGHT: 65 IN | WEIGHT: 290 LBS | DIASTOLIC BLOOD PRESSURE: 80 MMHG

## 2025-03-05 DIAGNOSIS — H61.22 IMPACTED CERUMEN OF LEFT EAR: ICD-10-CM

## 2025-03-05 DIAGNOSIS — J30.1 SEASONAL ALLERGIC RHINITIS DUE TO POLLEN: ICD-10-CM

## 2025-03-05 DIAGNOSIS — H90.3 SENSORINEURAL HEARING LOSS (SNHL) OF BOTH EARS: Primary | ICD-10-CM

## 2025-03-05 DIAGNOSIS — H90.3 SENSORINEURAL HEARING LOSS (SNHL) OF BOTH EARS: ICD-10-CM

## 2025-03-05 DIAGNOSIS — R42 DIZZINESS: Primary | ICD-10-CM

## 2025-03-05 DIAGNOSIS — R42 DIZZINESS AND GIDDINESS: ICD-10-CM

## 2025-03-05 DIAGNOSIS — G43.809 VESTIBULAR MIGRAINE: ICD-10-CM

## 2025-03-05 DIAGNOSIS — H92.03 OTALGIA OF BOTH EARS: ICD-10-CM

## 2025-03-05 PROCEDURE — 99213 OFFICE O/P EST LOW 20 MIN: CPT | Performed by: OTOLARYNGOLOGY

## 2025-03-05 PROCEDURE — 92557 COMPREHENSIVE HEARING TEST: CPT | Performed by: AUDIOLOGIST

## 2025-03-05 PROCEDURE — 69210 REMOVE IMPACTED EAR WAX UNI: CPT | Performed by: OTOLARYNGOLOGY

## 2025-03-05 PROCEDURE — 3079F DIAST BP 80-89 MM HG: CPT | Performed by: OTOLARYNGOLOGY

## 2025-03-05 PROCEDURE — 92567 TYMPANOMETRY: CPT | Performed by: AUDIOLOGIST

## 2025-03-05 PROCEDURE — 3074F SYST BP LT 130 MM HG: CPT | Performed by: OTOLARYNGOLOGY

## 2025-03-05 ASSESSMENT — ENCOUNTER SYMPTOMS
EYE ITCHING: 0
FACIAL SWELLING: 0
STRIDOR: 0
DIARRHEA: 0
TROUBLE SWALLOWING: 0
SORE THROAT: 0
COLOR CHANGE: 0
EYE REDNESS: 0
COUGH: 0
VOICE CHANGE: 0
RHINORRHEA: 0
PHOTOPHOBIA: 0
SHORTNESS OF BREATH: 0
NAUSEA: 0
EYE PAIN: 0
SINUS PAIN: 0
SINUS PRESSURE: 0
CHOKING: 0

## 2025-03-05 NOTE — PROGRESS NOTES
side.  The tympanic membrane is intact.  No fluid visualized in the middle ear. No complications.        Assessment and Plan     1. Dizziness  Patient has persistent worsening symptoms.  Symptoms are consistent with migraine.  I do suspect allergies are likely playing a role.  Discussed the benefits of immunotherapy.  She unfortunately cannot commit to therapy.  I would like her to do a trial of diamine oxidase enzyme.  She could have deficiency leading to elevated histamine levels from food.  Recommend taking 1 supplement before breakfast and dinner each day.  She will notify me if there is any symptom improvement.  If not, would likely do a trial of venlafaxine.  However she is already taking an SSRI and would need to wean off.  I may defer this to her PCP.    2. Vestibular migraine      3. Seasonal allergic rhinitis due to pollen      4. Sensorineural hearing loss (SNHL) of both ears    5.  Impacted cerumen of left ear  Left-sided cerumen removed with instruments.    Return if symptoms worsen or fail to improve.      [ ] Review/order radiology tests   [ ] Independent interpretation of diagnostic test by another provider  [ ] Discussed case with another provider  [ ] High risk of loss of major body function  [ ] Elective major surgery with risk factors    Portions of this note were dictated using Dragon. There may be linguistic errors secondary to the use of this program.

## 2025-03-10 ENCOUNTER — TELEPHONE (OUTPATIENT)
Dept: INTERNAL MEDICINE CLINIC | Age: 50
End: 2025-03-10

## 2025-03-10 NOTE — TELEPHONE ENCOUNTER
Pt is requesting a sooner appointment. She has one on 3/26 but she hit the back of her foot and can hardly walk on it. She is also having problems with Dizziness.

## 2025-03-15 SDOH — ECONOMIC STABILITY: TRANSPORTATION INSECURITY
IN THE PAST 12 MONTHS, HAS THE LACK OF TRANSPORTATION KEPT YOU FROM MEDICAL APPOINTMENTS OR FROM GETTING MEDICATIONS?: NO

## 2025-03-15 SDOH — ECONOMIC STABILITY: TRANSPORTATION INSECURITY
IN THE PAST 12 MONTHS, HAS LACK OF TRANSPORTATION KEPT YOU FROM MEETINGS, WORK, OR FROM GETTING THINGS NEEDED FOR DAILY LIVING?: NO

## 2025-03-15 SDOH — ECONOMIC STABILITY: INCOME INSECURITY: IN THE LAST 12 MONTHS, WAS THERE A TIME WHEN YOU WERE NOT ABLE TO PAY THE MORTGAGE OR RENT ON TIME?: NO

## 2025-03-15 SDOH — ECONOMIC STABILITY: FOOD INSECURITY: WITHIN THE PAST 12 MONTHS, THE FOOD YOU BOUGHT JUST DIDN'T LAST AND YOU DIDN'T HAVE MONEY TO GET MORE.: NEVER TRUE

## 2025-03-15 SDOH — ECONOMIC STABILITY: FOOD INSECURITY: WITHIN THE PAST 12 MONTHS, YOU WORRIED THAT YOUR FOOD WOULD RUN OUT BEFORE YOU GOT MONEY TO BUY MORE.: SOMETIMES TRUE

## 2025-03-18 ENCOUNTER — HOSPITAL ENCOUNTER (OUTPATIENT)
Dept: GENERAL RADIOLOGY | Age: 50
Discharge: HOME | End: 2025-03-18
Payer: COMMERCIAL

## 2025-03-18 ENCOUNTER — OFFICE VISIT (OUTPATIENT)
Dept: INTERNAL MEDICINE CLINIC | Age: 50
End: 2025-03-18
Payer: COMMERCIAL

## 2025-03-18 ENCOUNTER — RESULTS FOLLOW-UP (OUTPATIENT)
Dept: GENERAL RADIOLOGY | Age: 50
End: 2025-03-18

## 2025-03-18 VITALS
TEMPERATURE: 98.1 F | HEART RATE: 91 BPM | BODY MASS INDEX: 48.36 KG/M2 | OXYGEN SATURATION: 95 % | WEIGHT: 290.6 LBS | SYSTOLIC BLOOD PRESSURE: 114 MMHG | DIASTOLIC BLOOD PRESSURE: 82 MMHG

## 2025-03-18 DIAGNOSIS — M25.572 ACUTE LEFT ANKLE PAIN: Primary | ICD-10-CM

## 2025-03-18 DIAGNOSIS — J45.20 MILD INTERMITTENT ASTHMA WITHOUT COMPLICATION: ICD-10-CM

## 2025-03-18 DIAGNOSIS — F32.A ANXIETY AND DEPRESSION: ICD-10-CM

## 2025-03-18 DIAGNOSIS — F41.9 ANXIETY AND DEPRESSION: ICD-10-CM

## 2025-03-18 DIAGNOSIS — K21.9 GASTROESOPHAGEAL REFLUX DISEASE, UNSPECIFIED WHETHER ESOPHAGITIS PRESENT: ICD-10-CM

## 2025-03-18 DIAGNOSIS — R73.03 PREDIABETES: ICD-10-CM

## 2025-03-18 DIAGNOSIS — I10 PRIMARY HYPERTENSION: ICD-10-CM

## 2025-03-18 DIAGNOSIS — G43.909 MIGRAINE WITHOUT STATUS MIGRAINOSUS, NOT INTRACTABLE, UNSPECIFIED MIGRAINE TYPE: ICD-10-CM

## 2025-03-18 DIAGNOSIS — M25.572 ACUTE LEFT ANKLE PAIN: ICD-10-CM

## 2025-03-18 DIAGNOSIS — Z91.030 ALLERGY TO HONEY BEE VENOM: ICD-10-CM

## 2025-03-18 PROCEDURE — 3079F DIAST BP 80-89 MM HG: CPT | Performed by: INTERNAL MEDICINE

## 2025-03-18 PROCEDURE — 73610 X-RAY EXAM OF ANKLE: CPT

## 2025-03-18 PROCEDURE — 3074F SYST BP LT 130 MM HG: CPT | Performed by: INTERNAL MEDICINE

## 2025-03-18 PROCEDURE — 99214 OFFICE O/P EST MOD 30 MIN: CPT | Performed by: INTERNAL MEDICINE

## 2025-03-18 PROCEDURE — G2211 COMPLEX E/M VISIT ADD ON: HCPCS | Performed by: INTERNAL MEDICINE

## 2025-03-18 RX ORDER — MONTELUKAST SODIUM 10 MG/1
10 TABLET ORAL NIGHTLY
Qty: 90 TABLET | Refills: 1 | Status: SHIPPED | OUTPATIENT
Start: 2025-03-18

## 2025-03-18 RX ORDER — PANTOPRAZOLE SODIUM 40 MG/1
40 TABLET, DELAYED RELEASE ORAL 2 TIMES DAILY
Qty: 180 TABLET | Refills: 1 | Status: SHIPPED | OUTPATIENT
Start: 2025-03-18

## 2025-03-18 RX ORDER — LOSARTAN POTASSIUM 100 MG/1
100 TABLET ORAL DAILY
Qty: 90 TABLET | Refills: 1 | Status: SHIPPED | OUTPATIENT
Start: 2025-03-18

## 2025-03-18 RX ORDER — SPIRONOLACTONE AND HYDROCHLOROTHIAZIDE 25; 25 MG/1; MG/1
1 TABLET ORAL DAILY
Qty: 90 TABLET | Refills: 1 | Status: SHIPPED | OUTPATIENT
Start: 2025-03-18
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
## 2025-03-18 NOTE — ASSESSMENT & PLAN NOTE
Stable, within goal, tolerating tx well.   -continue losartan 100 mg, spironolactone with HCTZ 25/25 mg

## 2025-03-18 NOTE — ASSESSMENT & PLAN NOTE
-Per ENT dizziness likely malingering related, also could be triggered by allergies.  Considering immunotherapy and trial of diamine oxidase enzyme (had not started yet)

## 2025-03-18 NOTE — ASSESSMENT & PLAN NOTE
-continue to work on lifestyle modifications - low fat/ carb diet, regular physical activity, wt loss   -last A1c 5.7%, repeat before next visit  -Might consider GLP1 if covered by insurance, awaiting next labs
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
## 2025-03-18 NOTE — ASSESSMENT & PLAN NOTE
Uncontrolled, has been on PPI twice daily which we lowered to once daily.  Reportedly had endoscopy years ago, unclear findings  -ok to incrase PPI back to BID  -Would like her to repeat endoscopy.  She is due for colonoscopy this year either way, would like to get both

## 2025-03-18 NOTE — PROGRESS NOTES
insurance, awaiting next labs      Orders Placed This Encounter   Procedures    XR ANKLE LEFT (MIN 3 VIEWS)    CBC with Auto Differential    Comprehensive Metabolic Panel    Lipid Panel    Hemoglobin A1C    Bernard Hubbard MD, Orthopedic Surgery (Foot, Ankle), [facility]     Orders Placed This Encounter   Medications    EPINEPHrine (SYMJEPI) 0.3 MG/0.3ML SOSY injection     Sig: Inject 0.3 mLs into the muscle as needed for Anaphylaxis EMERGENCY FOR ALLERGIC REACTION     Dispense:  1 each     Refill:  1    losartan (COZAAR) 100 MG tablet     Sig: Take 1 tablet by mouth daily     Dispense:  90 tablet     Refill:  1    montelukast (SINGULAIR) 10 MG tablet     Sig: Take 1 tablet by mouth nightly     Dispense:  90 tablet     Refill:  1    spironolactone-hydroCHLOROthiazide (ALDACTAZIDE) 25-25 MG per tablet     Sig: Take 1 tablet by mouth daily     Dispense:  90 tablet     Refill:  1    pantoprazole (PROTONIX) 40 MG tablet     Sig: Take 1 tablet by mouth in the morning and at bedtime     Dispense:  180 tablet     Refill:  1      Medications Discontinued During This Encounter   Medication Reason    losartan (COZAAR) 100 MG tablet REORDER    pantoprazole (PROTONIX) 40 MG tablet REORDER    montelukast (SINGULAIR) 10 MG tablet REORDER    spironolactone-hydroCHLOROthiazide (ALDACTAZIDE) 25-25 MG per tablet REORDER    EPINEPHrine (SYMJEPI) 0.3 MG/0.3ML SOSY injection REORDER    lamoTRIgine (LAMICTAL XR) 50 MG extended release tablet Therapy completed        No follow-ups on file.    HPI  Hit her left ankle on the bed frame 3 weeks ago, pain since, pain with going up and down stairs, limping   Feeling better with lexapro      HISTORIES   Current Outpatient Medications on File Prior to Visit   Medication Sig Dispense Refill    escitalopram (LEXAPRO) 10 MG tablet Take 1 tablet by mouth daily 30 tablet 2    rizatriptan (MAXALT) 10 MG tablet Take 1 tablet by mouth once as needed for Migraine May repeat in 2 hours if

## 2025-03-19 ENCOUNTER — TELEPHONE (OUTPATIENT)
Dept: ORTHOPEDIC SURGERY | Age: 50
End: 2025-03-19

## 2025-03-19 NOTE — TELEPHONE ENCOUNTER
Appointment Request     Patient requesting earlier appointment: Yes  Appointment offered to patient: YES   Patient Contact Number: [phone]

## 2025-03-20 NOTE — TELEPHONE ENCOUNTER
LVM for patient that we could see her at [place] on 3/25/25 at 11:30am. Advised patient to call back to confirm appointment time will work for her.

## 2025-03-25 ENCOUNTER — OFFICE VISIT (OUTPATIENT)
Dept: ORTHOPEDIC SURGERY | Age: 50
End: 2025-03-25
Payer: COMMERCIAL

## 2025-03-25 VITALS — WEIGHT: 290 LBS | OXYGEN SATURATION: 98 % | HEART RATE: 87 BPM | HEIGHT: 65 IN | BODY MASS INDEX: 48.32 KG/M2

## 2025-03-25 DIAGNOSIS — S86.302A INJURY OF PERONEAL TENDON OF LEFT FOOT, INITIAL ENCOUNTER: ICD-10-CM

## 2025-03-25 DIAGNOSIS — M79.672 LEFT FOOT PAIN: Primary | ICD-10-CM

## 2025-03-25 DIAGNOSIS — S90.02XA CONTUSION OF LEFT ANKLE, INITIAL ENCOUNTER: ICD-10-CM

## 2025-03-25 PROCEDURE — 99204 OFFICE O/P NEW MOD 45 MIN: CPT | Performed by: ORTHOPAEDIC SURGERY

## 2025-03-25 PROCEDURE — L4361 PNEUMA/VAC WALK BOOT PRE OTS: HCPCS | Performed by: ORTHOPAEDIC SURGERY

## 2025-03-25 NOTE — PROGRESS NOTES
patient's next visit, depending on how the patient is doing and/or new imaging/labs results, we may consider the following options:    []  Lace up ankle     []  CAM boot         []  removable wrist brace     []  PT:        []  Wean out immobilization         []  Adv activity      []  Footmind        []  Spenco       []  Custom Orthotic:               []  AZ brace                    []  Rocker Bottom      []  Night splint    []  Heel cups        []  Strap        []  Toe gizmos    []  Topl        []  NSAIDs         []  Ayleen        []  Ref:         []  Stress Xray    []  CT        []  MRI  []  Inj:          []  Consider OR      []  Pick OR date    No follow-ups on file.    No orders of the defined types were placed in this encounter.    Orders Placed This Encounter   Procedures    XR ANKLE LEFT (MIN 3 VIEWS)     WEIGHT BEARING 3 VIEWS:  AP, MORTISE, LATERAL  Please include entire foot    ROOM 14     Standing Status:   Future     Number of Occurrences:   1     Expected Date:   3/25/2025     Expiration Date:   4/20/2025     Reason for exam::   eval alignment    XR FOOT LEFT (MIN 3 VIEWS)     WEIGHTBEARING 3 views: AP, Oblique, Lateral    ROOM 14     Standing Status:   Future     Number of Occurrences:   1     Expected Date:   3/25/2025     Expiration Date:   4/20/2025     Reason for exam::   eval alignment         Bernard Marlow MD  Orthopedic Surgery        Please excuse any typos/errors, as this note was created with the assistance of voice recognition software. While intending to generate a document that actually reflects the content of the visit, the document can still have some errors including those of syntax and sound-a-like substitutions which may escape proof reading. In such instances, actual meaning can be extrapolated by context.   
(4) no limitation

## 2025-03-29 ASSESSMENT — ACTIVITIES OF DAILY LIVING (ADL): FAAM_ACTIVITIES_OF_DAILY_LIVING_SCORE: 39.47

## 2025-04-01 ENCOUNTER — HOSPITAL ENCOUNTER (OUTPATIENT)
Dept: PHYSICAL THERAPY | Age: 50
Setting detail: THERAPIES SERIES
Discharge: HOME | End: 2025-04-01
Attending: ORTHOPAEDIC SURGERY

## 2025-04-08 ENCOUNTER — PATIENT MESSAGE (OUTPATIENT)
Dept: INTERNAL MEDICINE CLINIC | Age: 50
End: 2025-04-08

## 2025-04-08 DIAGNOSIS — R76.8 POSITIVE ANA (ANTINUCLEAR ANTIBODY): Primary | ICD-10-CM

## 2025-04-15 PROBLEM — I10 PRIMARY HYPERTENSION: Chronic | Status: ACTIVE | Noted: 2023-09-29

## 2025-04-15 PROBLEM — E66.813 CLASS 3 SEVERE OBESITY DUE TO EXCESS CALORIES WITH SERIOUS COMORBIDITY AND BODY MASS INDEX (BMI) OF 45.0 TO 49.9 IN ADULT (HCC): Chronic | Status: ACTIVE | Noted: 2025-04-15

## 2025-04-15 PROBLEM — G43.909 MIGRAINE: Chronic | Status: ACTIVE | Noted: 2024-01-23

## 2025-04-15 PROBLEM — F32.A ANXIETY AND DEPRESSION: Chronic | Status: ACTIVE | Noted: 2023-09-29

## 2025-04-15 PROBLEM — F41.9 ANXIETY AND DEPRESSION: Chronic | Status: ACTIVE | Noted: 2023-09-29

## 2025-04-21 ENCOUNTER — TELEMEDICINE (OUTPATIENT)
Dept: PSYCHIATRY | Age: 50
End: 2025-04-21
Payer: COMMERCIAL

## 2025-04-21 DIAGNOSIS — F43.10 PTSD (POST-TRAUMATIC STRESS DISORDER): ICD-10-CM

## 2025-04-21 DIAGNOSIS — F41.1 GAD (GENERALIZED ANXIETY DISORDER): Primary | ICD-10-CM

## 2025-04-21 DIAGNOSIS — F33.1 MODERATE EPISODE OF RECURRENT MAJOR DEPRESSIVE DISORDER (HCC): ICD-10-CM

## 2025-04-21 PROCEDURE — 90833 PSYTX W PT W E/M 30 MIN: CPT | Performed by: STUDENT IN AN ORGANIZED HEALTH CARE EDUCATION/TRAINING PROGRAM

## 2025-04-21 PROCEDURE — 99214 OFFICE O/P EST MOD 30 MIN: CPT | Performed by: STUDENT IN AN ORGANIZED HEALTH CARE EDUCATION/TRAINING PROGRAM

## 2025-04-21 RX ORDER — BUPROPION HYDROCHLORIDE 150 MG/1
150 TABLET ORAL EVERY MORNING
Qty: 90 TABLET | Refills: 2 | Status: SHIPPED | OUTPATIENT
Start: 2025-04-21

## 2025-04-21 RX ORDER — ESCITALOPRAM OXALATE 10 MG/1
10 TABLET ORAL DAILY
Qty: 90 TABLET | Refills: 2 | Status: SHIPPED | OUTPATIENT
Start: 2025-04-21

## 2025-04-21 ASSESSMENT — ANXIETY QUESTIONNAIRES
3. WORRYING TOO MUCH ABOUT DIFFERENT THINGS: NEARLY EVERY DAY
2. NOT BEING ABLE TO STOP OR CONTROL WORRYING: NEARLY EVERY DAY
6. BECOMING EASILY ANNOYED OR IRRITABLE: NEARLY EVERY DAY
IF YOU CHECKED OFF ANY PROBLEMS ON THIS QUESTIONNAIRE, HOW DIFFICULT HAVE THESE PROBLEMS MADE IT FOR YOU TO DO YOUR WORK, TAKE CARE OF THINGS AT HOME, OR GET ALONG WITH OTHER PEOPLE: VERY DIFFICULT
GAD7 TOTAL SCORE: 16
4. TROUBLE RELAXING: SEVERAL DAYS
5. BEING SO RESTLESS THAT IT IS HARD TO SIT STILL: SEVERAL DAYS
7. FEELING AFRAID AS IF SOMETHING AWFUL MIGHT HAPPEN: NEARLY EVERY DAY
1. FEELING NERVOUS, ANXIOUS, OR ON EDGE: MORE THAN HALF THE DAYS

## 2025-04-21 ASSESSMENT — PATIENT HEALTH QUESTIONNAIRE - PHQ9
4. FEELING TIRED OR HAVING LITTLE ENERGY: NEARLY EVERY DAY
6. FEELING BAD ABOUT YOURSELF - OR THAT YOU ARE A FAILURE OR HAVE LET YOURSELF OR YOUR FAMILY DOWN: MORE THAN HALF THE DAYS
2. FEELING DOWN, DEPRESSED OR HOPELESS: NEARLY EVERY DAY
SUM OF ALL RESPONSES TO PHQ QUESTIONS 1-9: 13
9. THOUGHTS THAT YOU WOULD BE BETTER OFF DEAD, OR OF HURTING YOURSELF: MORE THAN HALF THE DAYS
10. IF YOU CHECKED OFF ANY PROBLEMS, HOW DIFFICULT HAVE THESE PROBLEMS MADE IT FOR YOU TO DO YOUR WORK, TAKE CARE OF THINGS AT HOME, OR GET ALONG WITH OTHER PEOPLE: VERY DIFFICULT
SUM OF ALL RESPONSES TO PHQ QUESTIONS 1-9: 15
8. MOVING OR SPEAKING SO SLOWLY THAT OTHER PEOPLE COULD HAVE NOTICED. OR THE OPPOSITE, BEING SO FIGETY OR RESTLESS THAT YOU HAVE BEEN MOVING AROUND A LOT MORE THAN USUAL: NOT AT ALL
3. TROUBLE FALLING OR STAYING ASLEEP: MORE THAN HALF THE DAYS
5. POOR APPETITE OR OVEREATING: SEVERAL DAYS
7. TROUBLE CONCENTRATING ON THINGS, SUCH AS READING THE NEWSPAPER OR WATCHING TELEVISION: SEVERAL DAYS
SUM OF ALL RESPONSES TO PHQ QUESTIONS 1-9: 15
SUM OF ALL RESPONSES TO PHQ QUESTIONS 1-9: 15
1. LITTLE INTEREST OR PLEASURE IN DOING THINGS: SEVERAL DAYS

## 2025-04-21 NOTE — PROGRESS NOTES
Outpatient Psychiatry Integrative Care  Psychiatry Follow-up  [facility]     Patient name: Kateryna Albert  YOB: 1975  MRN: [id number]  Payor: Payor: BCBS / Plan: BCBS OUT OF STATE / Product Type: *No Product type* /   Day of Service: 04/21/2025      Referring Primary Care Clinician: Zuhair Rawls MD    Kateryna Albert was evaluated through a synchronous (real-time) audio-video encounter. The patient (or guardian if applicable) is aware that this is a billable service, which includes applicable co-pays. This Virtual Visit was conducted with patient's (and/or legal guardian's) consent. Patient was deemed to be appropriate for Virtual Visit. Patient identification was verified, and a caregiver was present when appropriate. This provider is licensed in the [facility].  The patient was located at: home in the [facility]  Provider was located at: home in [place], [place]    Time spent on encounter: 30 minutes       Chief Complaint:     Chief Complaint   Patient presents with    Follow-up       Assessment and Plan:     Kateryna Albert is a 49 y.o. White (non-) female with a history of MDD and MARCI who presents to outpatient primary care psychiatry for follow up.    Patient meets criteria for major depressive disorder, generalized anxiety disorder and posttraumatic stress disorder.      Lexapro 10mg has been marginally helpful for depression and anxiety. Continues to experience fatigue and low motivation. Has not tried Wellbutrin XL before, so will start at 150mg.     Plan to likely increase both Lexapro and Wellbutrin as needed.    Referral to sleep medicine to reassess obstructive sleep apnea and explore treatments that do not involve a mask overnight if possible. Patient has sleep study scheduled.    I recommend psychotherapy. Informed patient of trauma focused therapy, including cognitive processing therapy and EMDR; we will discuss more at next appointment. Referred to

## 2025-04-21 NOTE — TELEPHONE ENCOUNTER
Please let pt know I am sorry I am only now seeing this or I would have discussed it with her today when she was in with her mother in [place].  Okay to refer to rheumatology, keep us updated on carotid artery testing

## 2025-04-21 NOTE — PATIENT INSTRUCTIONS
Cognitive Processing Therapy (CPT) at the  [facility] [phone].    In the event of suicidal ideations or psychiatric emergency you can:  Call 911 or go to the nearest emergency room  Call the suicide hotline at 988  Call the  Mobile Crisis Team at [phone]

## 2025-05-02 DIAGNOSIS — I10 PRIMARY HYPERTENSION: ICD-10-CM

## 2025-05-02 RX ORDER — SPIRONOLACTONE AND HYDROCHLOROTHIAZIDE 25; 25 MG/1; MG/1
1 TABLET ORAL DAILY
Qty: 90 TABLET | Refills: 1 | Status: SHIPPED | OUTPATIENT
Start: 2025-05-02

## 2025-05-06 ENCOUNTER — OFFICE VISIT (OUTPATIENT)
Dept: ORTHOPEDIC SURGERY | Age: 50
End: 2025-05-06
Payer: COMMERCIAL

## 2025-05-06 VITALS — BODY MASS INDEX: 47.48 KG/M2 | WEIGHT: 285 LBS | OXYGEN SATURATION: 98 % | HEIGHT: 65 IN | HEART RATE: 87 BPM

## 2025-05-06 DIAGNOSIS — S86.302D INJURY OF PERONEAL TENDON OF LEFT FOOT, SUBSEQUENT ENCOUNTER: Primary | ICD-10-CM

## 2025-05-06 PROCEDURE — L1902 AFO ANKLE GAUNTLET PRE OTS: HCPCS | Performed by: ORTHOPAEDIC SURGERY

## 2025-05-06 PROCEDURE — 99214 OFFICE O/P EST MOD 30 MIN: CPT | Performed by: ORTHOPAEDIC SURGERY

## 2025-05-06 RX ORDER — LIDOCAINE 4 G/G
PATCH TOPICAL
Qty: 14 PATCH | Refills: 0 | Status: SHIPPED | OUTPATIENT
Start: 2025-05-06

## 2025-05-06 NOTE — PROGRESS NOTES
[facility] SPECIALTY CARE [facility] ORTHOPAEDICS AND SPINE  [address]  [address]  [facility] [address]  Dept: [phone]  Loc: [phone]    Ambulatory Orthopedic Consult      CHIEF COMPLAINT:    Chief Complaint   Patient presents with    Ankle Pain     LEFT       HISTORY OF PRESENT ILLNESS:      The patient is a 49 y.o. female who is being seen for evaluation of the above, which began 2/25/2025 secondary to hitting her left ankle on a bed frame. At today's visit, she is using no brace/assistive device.     History is obtained today from:   [x]  the patient     [x]  EMR     []  one family member/friend    []  multiple family members/friends    []  other:      At today's visit, the patient localizes pain to the left lateral ankle/hindfoot at the peroneal tendons posterior to the fibula.  She denies feeling a pop at the time of the injury, denies ecchymosis, but reports some swelling.    INTERVAL HISTORY 5/6/2025:  She is seen again today in the office for follow up of a previous issue (as above). Since being seen last, the patient is doing better. At today's visit, she is using a CAM boot.     History is obtained today from:   [x]  the patient     []  EMR     []  one family member/friend    []  multiple family members/friends    []  other:      She does report that she is better overall, but is still experiencing significant pain over the peroneal tendons.  REVIEW OF SYSTEMS:  Musculoskeletal: See HPI for pertinent positives     Past Medical History:    She  has a past medical history of ADHD (attention deficit hyperactivity disorder) (Probably around 1987), Allergic rhinitis (1991), Anxiety (1980?), Arthritis, Asthma (1989), Class 3 severe obesity due to excess calories with serious comorbidity and body mass index (BMI) of 45.0 to 49.9 in adult ([facility]) (04/15/2025), Depression (1980?), Dizziness, Fibromyalgia (2020), GERD (gastroesophageal reflux

## 2025-05-06 NOTE — TELEPHONE ENCOUNTER
Requested Prescriptions     Pending Prescriptions Disp Refills    lidocaine 4 % external patch 14 patch 0     Sig: Apply to affected area; leave in place no longer than 12 hrs then remove the patch; use no longer than 12 hrs/day total

## 2025-05-07 ENCOUNTER — TELEPHONE (OUTPATIENT)
Dept: ORTHOPEDIC SURGERY | Age: 50
End: 2025-05-07

## 2025-05-17 DIAGNOSIS — F33.1 MODERATE EPISODE OF RECURRENT MAJOR DEPRESSIVE DISORDER (HCC): ICD-10-CM

## 2025-05-19 RX ORDER — ESCITALOPRAM OXALATE 10 MG/1
10 TABLET ORAL DAILY
Qty: 30 TABLET | OUTPATIENT
Start: 2025-05-19

## 2025-05-19 RX ORDER — ESCITALOPRAM OXALATE 10 MG/1
10 TABLET ORAL DAILY
Qty: 90 TABLET | Refills: 2 | Status: SHIPPED | OUTPATIENT
Start: 2025-05-19

## 2025-05-20 ENCOUNTER — OFFICE VISIT (OUTPATIENT)
Dept: ORTHOPEDIC SURGERY | Age: 50
End: 2025-05-20

## 2025-05-20 VITALS — HEIGHT: 65 IN | OXYGEN SATURATION: 98 % | WEIGHT: 285.06 LBS | BODY MASS INDEX: 47.49 KG/M2 | HEART RATE: 91 BPM

## 2025-05-20 DIAGNOSIS — S86.302A INJURY OF PERONEAL TENDON OF LEFT FOOT, INITIAL ENCOUNTER: ICD-10-CM

## 2025-05-20 DIAGNOSIS — S93.409D SPRAIN OF LIGAMENT OF ANKLE, SUBSEQUENT ENCOUNTER: Primary | ICD-10-CM

## 2025-05-20 DIAGNOSIS — M25.372 INSTABILITY OF LEFT ANKLE JOINT: ICD-10-CM

## 2025-05-20 RX ORDER — TRIAMCINOLONE ACETONIDE 40 MG/ML
80 INJECTION, SUSPENSION INTRA-ARTICULAR; INTRAMUSCULAR ONCE
Status: COMPLETED | OUTPATIENT
Start: 2025-05-20 | End: 2025-05-20

## 2025-05-20 RX ORDER — LIDOCAINE HYDROCHLORIDE 10 MG/ML
4 INJECTION, SOLUTION INFILTRATION; PERINEURAL ONCE
Status: COMPLETED | OUTPATIENT
Start: 2025-05-20 | End: 2025-05-20

## 2025-05-20 RX ORDER — NAPROXEN 500 MG/1
500 TABLET ORAL 2 TIMES DAILY PRN
Qty: 60 TABLET | Refills: 0 | Status: SHIPPED | OUTPATIENT
Start: 2025-05-20

## 2025-05-20 RX ADMIN — LIDOCAINE HYDROCHLORIDE 4 ML: 10 INJECTION, SOLUTION INFILTRATION; PERINEURAL at 11:05

## 2025-05-20 RX ADMIN — TRIAMCINOLONE ACETONIDE 80 MG: 40 INJECTION, SUSPENSION INTRA-ARTICULAR; INTRAMUSCULAR at 11:05

## 2025-05-20 NOTE — PROGRESS NOTES
[facility] SPECIALTY CARE [facility] ORTHOPAEDICS AND SPINE  [address]  [address]  [place] [address]  Dept: [phone]  Loc: [phone]    Ambulatory Orthopedic Consult      CHIEF COMPLAINT:    Chief Complaint   Patient presents with    Ankle Pain     Left       HISTORY OF PRESENT ILLNESS:      The patient is a 49 y.o. female who is being seen for evaluation of the above, which began 2/25/2025 secondary to hitting her left ankle on a bed frame. At today's visit, she is using no brace/assistive device.     History is obtained today from:   [x]  the patient     [x]  EMR     []  one family member/friend    []  multiple family members/friends    []  other:      At today's visit, the patient localizes pain to the left lateral ankle/hindfoot at the peroneal tendons posterior to the fibula.  She denies feeling a pop at the time of the injury, denies ecchymosis, but reports some swelling.    INTERVAL HISTORY 5/6/2025:  She is seen again today in the office for follow up of a previous issue (as above). Since being seen last, the patient is doing better. At today's visit, she is using a CAM boot.     History is obtained today from:   [x]  the patient     []  EMR     []  one family member/friend    []  multiple family members/friends    []  other:      She does report that she is better overall, but is still experiencing significant pain over the peroneal tendons.    INTERVAL HISTORY 5/20/2025:  She is seen again today in the office for follow up of imaging as below. Since being seen last, the patient is doing worse. At today's visit, she is using a CAM boot.    History is obtained today from:   [x]  the patient     [x]  EMR     []  one family member/friend    []  multiple family members/friends    []  other:      At today's visit, she localizes pain to the left lateral ankle/hindfoot and anterior ankle/dorsal foot; she also reports an occasional

## 2025-06-11 ENCOUNTER — RESULTS FOLLOW-UP (OUTPATIENT)
Dept: INTERNAL MEDICINE CLINIC | Age: 50
End: 2025-06-11

## 2025-07-11 ASSESSMENT — PATIENT HEALTH QUESTIONNAIRE - PHQ9
SUM OF ALL RESPONSES TO PHQ QUESTIONS 1-9: 12
7. TROUBLE CONCENTRATING ON THINGS, SUCH AS READING THE NEWSPAPER OR WATCHING TELEVISION: SEVERAL DAYS
6. FEELING BAD ABOUT YOURSELF - OR THAT YOU ARE A FAILURE OR HAVE LET YOURSELF OR YOUR FAMILY DOWN: MORE THAN HALF THE DAYS
2. FEELING DOWN, DEPRESSED OR HOPELESS: SEVERAL DAYS
2. FEELING DOWN, DEPRESSED OR HOPELESS: SEVERAL DAYS
10. IF YOU CHECKED OFF ANY PROBLEMS, HOW DIFFICULT HAVE THESE PROBLEMS MADE IT FOR YOU TO DO YOUR WORK, TAKE CARE OF THINGS AT HOME, OR GET ALONG WITH OTHER PEOPLE: VERY DIFFICULT
6. FEELING BAD ABOUT YOURSELF - OR THAT YOU ARE A FAILURE OR HAVE LET YOURSELF OR YOUR FAMILY DOWN: MORE THAN HALF THE DAYS
9. THOUGHTS THAT YOU WOULD BE BETTER OFF DEAD, OR OF HURTING YOURSELF: NOT AT ALL
1. LITTLE INTEREST OR PLEASURE IN DOING THINGS: SEVERAL DAYS
3. TROUBLE FALLING OR STAYING ASLEEP: NEARLY EVERY DAY
SUM OF ALL RESPONSES TO PHQ QUESTIONS 1-9: 12
8. MOVING OR SPEAKING SO SLOWLY THAT OTHER PEOPLE COULD HAVE NOTICED. OR THE OPPOSITE, BEING SO FIGETY OR RESTLESS THAT YOU HAVE BEEN MOVING AROUND A LOT MORE THAN USUAL: NOT AT ALL
7. TROUBLE CONCENTRATING ON THINGS, SUCH AS READING THE NEWSPAPER OR WATCHING TELEVISION: SEVERAL DAYS
SUM OF ALL RESPONSES TO PHQ QUESTIONS 1-9: 12
4. FEELING TIRED OR HAVING LITTLE ENERGY: NEARLY EVERY DAY
9. THOUGHTS THAT YOU WOULD BE BETTER OFF DEAD, OR OF HURTING YOURSELF: NOT AT ALL
10. IF YOU CHECKED OFF ANY PROBLEMS, HOW DIFFICULT HAVE THESE PROBLEMS MADE IT FOR YOU TO DO YOUR WORK, TAKE CARE OF THINGS AT HOME, OR GET ALONG WITH OTHER PEOPLE: VERY DIFFICULT
8. MOVING OR SPEAKING SO SLOWLY THAT OTHER PEOPLE COULD HAVE NOTICED. OR THE OPPOSITE - BEING SO FIDGETY OR RESTLESS THAT YOU HAVE BEEN MOVING AROUND A LOT MORE THAN USUAL: NOT AT ALL
3. TROUBLE FALLING OR STAYING ASLEEP: NEARLY EVERY DAY
SUM OF ALL RESPONSES TO PHQ QUESTIONS 1-9: 12
1. LITTLE INTEREST OR PLEASURE IN DOING THINGS: SEVERAL DAYS
4. FEELING TIRED OR HAVING LITTLE ENERGY: NEARLY EVERY DAY
SUM OF ALL RESPONSES TO PHQ QUESTIONS 1-9: 12
5. POOR APPETITE OR OVEREATING: SEVERAL DAYS
5. POOR APPETITE OR OVEREATING: SEVERAL DAYS

## 2025-07-11 ASSESSMENT — ANXIETY QUESTIONNAIRES
IF YOU CHECKED OFF ANY PROBLEMS ON THIS QUESTIONNAIRE, HOW DIFFICULT HAVE THESE PROBLEMS MADE IT FOR YOU TO DO YOUR WORK, TAKE CARE OF THINGS AT HOME, OR GET ALONG WITH OTHER PEOPLE: EXTREMELY DIFFICULT
1. FEELING NERVOUS, ANXIOUS, OR ON EDGE: NEARLY EVERY DAY
3. WORRYING TOO MUCH ABOUT DIFFERENT THINGS: NEARLY EVERY DAY
GAD7 TOTAL SCORE: 17
7. FEELING AFRAID AS IF SOMETHING AWFUL MIGHT HAPPEN: NEARLY EVERY DAY
4. TROUBLE RELAXING: SEVERAL DAYS
5. BEING SO RESTLESS THAT IT IS HARD TO SIT STILL: SEVERAL DAYS
5. BEING SO RESTLESS THAT IT IS HARD TO SIT STILL: SEVERAL DAYS
IF YOU CHECKED OFF ANY PROBLEMS ON THIS QUESTIONNAIRE, HOW DIFFICULT HAVE THESE PROBLEMS MADE IT FOR YOU TO DO YOUR WORK, TAKE CARE OF THINGS AT HOME, OR GET ALONG WITH OTHER PEOPLE: EXTREMELY DIFFICULT
4. TROUBLE RELAXING: SEVERAL DAYS
7. FEELING AFRAID AS IF SOMETHING AWFUL MIGHT HAPPEN: NEARLY EVERY DAY
1. FEELING NERVOUS, ANXIOUS, OR ON EDGE: NEARLY EVERY DAY
2. NOT BEING ABLE TO STOP OR CONTROL WORRYING: NEARLY EVERY DAY
2. NOT BEING ABLE TO STOP OR CONTROL WORRYING: NEARLY EVERY DAY
6. BECOMING EASILY ANNOYED OR IRRITABLE: NEARLY EVERY DAY
6. BECOMING EASILY ANNOYED OR IRRITABLE: NEARLY EVERY DAY
3. WORRYING TOO MUCH ABOUT DIFFERENT THINGS: NEARLY EVERY DAY

## 2025-07-14 ENCOUNTER — TELEMEDICINE (OUTPATIENT)
Dept: PSYCHIATRY | Age: 50
End: 2025-07-14
Payer: COMMERCIAL

## 2025-07-14 DIAGNOSIS — F33.1 MODERATE EPISODE OF RECURRENT MAJOR DEPRESSIVE DISORDER (HCC): ICD-10-CM

## 2025-07-14 DIAGNOSIS — F41.1 GAD (GENERALIZED ANXIETY DISORDER): Primary | ICD-10-CM

## 2025-07-14 DIAGNOSIS — F43.10 PTSD (POST-TRAUMATIC STRESS DISORDER): ICD-10-CM

## 2025-07-14 PROCEDURE — 90833 PSYTX W PT W E/M 30 MIN: CPT | Performed by: STUDENT IN AN ORGANIZED HEALTH CARE EDUCATION/TRAINING PROGRAM

## 2025-07-14 PROCEDURE — 99214 OFFICE O/P EST MOD 30 MIN: CPT | Performed by: STUDENT IN AN ORGANIZED HEALTH CARE EDUCATION/TRAINING PROGRAM

## 2025-07-14 RX ORDER — ESCITALOPRAM OXALATE 20 MG/1
20 TABLET ORAL DAILY
Qty: 30 TABLET | Refills: 3 | Status: SHIPPED | OUTPATIENT
Start: 2025-07-14

## 2025-07-14 RX ORDER — HYDROXYZINE HYDROCHLORIDE 25 MG/1
25 TABLET, FILM COATED ORAL DAILY PRN
Qty: 30 TABLET | Refills: 0 | Status: SHIPPED | OUTPATIENT
Start: 2025-07-14 | End: 2025-08-13

## 2025-07-14 NOTE — PROGRESS NOTES
Outpatient Psychiatry Integrative Care  Psychiatry Follow-up  [facility]     Patient name: Kateryna Albert  YOB: 1975  MRN: [id number]  Payor: Payor: BCBS / Plan: BCBS OUT OF STATE / Product Type: *No Product type* /   Day of Service: 04/21/2025      Referring Primary Care Clinician: Zuhair Rawls MD    Kateryna Albert was evaluated through a synchronous (real-time) audio-video encounter. The patient (or guardian if applicable) is aware that this is a billable service, which includes applicable co-pays. This Virtual Visit was conducted with patient's (and/or legal guardian's) consent. Patient was deemed to be appropriate for Virtual Visit. Patient identification was verified, and a caregiver was present when appropriate. This provider is licensed in the [facility].  The patient was located at: home in the [facility]  Provider was located at: home in [place], [place]    Time spent on encounter: 30 minutes       Chief Complaint:     Chief Complaint   Patient presents with    Follow-up       Assessment and Plan:     Kateryna Albert is a 49 y.o. White (non-) female with a history of MDD, MARCI, and PTSD who presents to outpatient primary care psychiatry for follow up.    Patient meets criteria for major depressive disorder, generalized anxiety disorder and posttraumatic stress disorder.      Addition of Wellbutrin XL 150mg has been helpful for depression; will continue. Lexapro 10mg has been marginally helpful for depression and anxiety. Will increase Lexapro to 20mg for worsening anxiety.    Will add PRN hydroxyzine 25mg for panic attacks. Patient takes Claritin daily; will monitor. Could consider propranolol in the future, but patient has asthma.    Plan to likely increase Wellbutrin if needed.    Referral to sleep medicine to reassess obstructive sleep apnea and explore treatments that do not involve a mask overnight if possible. Patient has sleep study scheduled 8/1.    I

## 2025-07-16 RX ORDER — RIZATRIPTAN BENZOATE 10 MG/1
10 TABLET ORAL
Qty: 27 TABLET | Refills: 1 | Status: SHIPPED | OUTPATIENT
Start: 2025-07-16

## 2025-07-16 NOTE — TELEPHONE ENCOUNTER
Last appointment: 3/18/2025  Next appointment: 9/18/2025        Last refill: (9/26/2024) by Zuhair Rawls MD

## 2025-08-12 ENCOUNTER — OFFICE VISIT (OUTPATIENT)
Dept: ORTHOPEDIC SURGERY | Age: 50
End: 2025-08-12
Payer: COMMERCIAL

## 2025-08-12 VITALS — HEIGHT: 65 IN | OXYGEN SATURATION: 98 % | BODY MASS INDEX: 47.49 KG/M2 | WEIGHT: 285.06 LBS | HEART RATE: 94 BPM

## 2025-08-12 DIAGNOSIS — M25.372 INSTABILITY OF LEFT ANKLE JOINT: ICD-10-CM

## 2025-08-12 DIAGNOSIS — S93.409D SPRAIN OF LIGAMENT OF ANKLE, SUBSEQUENT ENCOUNTER: Primary | ICD-10-CM

## 2025-08-12 PROCEDURE — 99212 OFFICE O/P EST SF 10 MIN: CPT | Performed by: ORTHOPAEDIC SURGERY
